# Patient Record
Sex: FEMALE | Race: WHITE | URBAN - METROPOLITAN AREA
[De-identification: names, ages, dates, MRNs, and addresses within clinical notes are randomized per-mention and may not be internally consistent; named-entity substitution may affect disease eponyms.]

---

## 2023-04-18 ENCOUNTER — OFFICE VISIT (OUTPATIENT)
Dept: INTERNAL MEDICINE CLINIC | Facility: CLINIC | Age: 28
End: 2023-04-18

## 2023-04-18 VITALS
HEART RATE: 86 BPM | HEIGHT: 68 IN | BODY MASS INDEX: 23.64 KG/M2 | DIASTOLIC BLOOD PRESSURE: 66 MMHG | OXYGEN SATURATION: 98 % | SYSTOLIC BLOOD PRESSURE: 98 MMHG | WEIGHT: 156 LBS

## 2023-04-18 DIAGNOSIS — Z13.220 LIPID SCREENING: ICD-10-CM

## 2023-04-18 DIAGNOSIS — Z13.0 SCREENING FOR DEFICIENCY ANEMIA: ICD-10-CM

## 2023-04-18 DIAGNOSIS — Z13.21 ENCOUNTER FOR VITAMIN DEFICIENCY SCREENING: ICD-10-CM

## 2023-04-18 DIAGNOSIS — R87.618 PAP SMEAR ABNORMALITY OF CERVIX/HUMAN PAPILLOMAVIRUS (HPV) POSITIVE: ICD-10-CM

## 2023-04-18 DIAGNOSIS — Z13.29 THYROID DISORDER SCREEN: ICD-10-CM

## 2023-04-18 DIAGNOSIS — B37.31 CANDIDA VAGINITIS: ICD-10-CM

## 2023-04-18 DIAGNOSIS — Z00.00 WELLNESS EXAMINATION: Primary | ICD-10-CM

## 2023-04-18 DIAGNOSIS — R73.09 ELEVATED GLUCOSE: ICD-10-CM

## 2023-04-18 PROBLEM — F12.90 CANNABIS USE DISORDER: Status: ACTIVE | Noted: 2023-04-18

## 2023-04-18 PROBLEM — F41.1 GAD (GENERALIZED ANXIETY DISORDER): Status: ACTIVE | Noted: 2023-04-18

## 2023-04-18 PROCEDURE — 3074F SYST BP LT 130 MM HG: CPT | Performed by: INTERNAL MEDICINE

## 2023-04-18 PROCEDURE — 3078F DIAST BP <80 MM HG: CPT | Performed by: INTERNAL MEDICINE

## 2023-04-18 PROCEDURE — 3008F BODY MASS INDEX DOCD: CPT | Performed by: INTERNAL MEDICINE

## 2023-04-18 PROCEDURE — 99395 PREV VISIT EST AGE 18-39: CPT | Performed by: INTERNAL MEDICINE

## 2023-04-18 PROCEDURE — 99214 OFFICE O/P EST MOD 30 MIN: CPT | Performed by: INTERNAL MEDICINE

## 2023-04-18 RX ORDER — FLUCONAZOLE 150 MG/1
TABLET ORAL
Qty: 12 TABLET | Refills: 0 | Status: SHIPPED | OUTPATIENT
Start: 2023-04-18

## 2023-04-18 RX ORDER — CITALOPRAM 40 MG/1
40 TABLET ORAL DAILY
COMMUNITY
Start: 2023-04-04

## 2023-06-08 ENCOUNTER — PATIENT MESSAGE (OUTPATIENT)
Dept: INTERNAL MEDICINE CLINIC | Facility: CLINIC | Age: 28
End: 2023-06-08

## 2023-06-08 DIAGNOSIS — B37.31 CANDIDA VAGINITIS: ICD-10-CM

## 2023-06-08 RX ORDER — FLUCONAZOLE 150 MG/1
TABLET ORAL
Qty: 6 TABLET | Refills: 0 | Status: SHIPPED | OUTPATIENT
Start: 2023-06-08

## 2023-06-22 DIAGNOSIS — B37.31 CANDIDA VAGINITIS: ICD-10-CM

## 2023-06-26 ENCOUNTER — PATIENT MESSAGE (OUTPATIENT)
Dept: INTERNAL MEDICINE CLINIC | Facility: CLINIC | Age: 28
End: 2023-06-26

## 2023-06-27 RX ORDER — FLUCONAZOLE 150 MG/1
TABLET ORAL
Qty: 12 TABLET | Refills: 0 | Status: SHIPPED | OUTPATIENT
Start: 2023-06-27

## 2023-06-28 ENCOUNTER — PATIENT MESSAGE (OUTPATIENT)
Dept: INTERNAL MEDICINE CLINIC | Facility: CLINIC | Age: 28
End: 2023-06-28

## 2023-06-29 RX ORDER — LORAZEPAM 1 MG/1
1 TABLET ORAL EVERY 6 HOURS PRN
Qty: 30 TABLET | Refills: 0 | Status: SHIPPED | OUTPATIENT
Start: 2023-06-29

## 2023-07-05 ENCOUNTER — LAB ENCOUNTER (OUTPATIENT)
Dept: LAB | Facility: HOSPITAL | Age: 28
End: 2023-07-05
Attending: INTERNAL MEDICINE
Payer: COMMERCIAL

## 2023-07-05 ENCOUNTER — OFFICE VISIT (OUTPATIENT)
Dept: OBGYN CLINIC | Facility: CLINIC | Age: 28
End: 2023-07-05

## 2023-07-05 VITALS
HEIGHT: 68 IN | WEIGHT: 155.63 LBS | SYSTOLIC BLOOD PRESSURE: 114 MMHG | HEART RATE: 61 BPM | DIASTOLIC BLOOD PRESSURE: 74 MMHG | BODY MASS INDEX: 23.59 KG/M2

## 2023-07-05 DIAGNOSIS — F41.9 ANXIETY: ICD-10-CM

## 2023-07-05 DIAGNOSIS — F12.90 MARIJUANA USE: ICD-10-CM

## 2023-07-05 DIAGNOSIS — Z71.6 ENCOUNTER FOR SMOKING CESSATION COUNSELING: ICD-10-CM

## 2023-07-05 DIAGNOSIS — Z12.4 SCREENING FOR MALIGNANT NEOPLASM OF CERVIX: ICD-10-CM

## 2023-07-05 DIAGNOSIS — Z01.419 WELL WOMAN EXAM WITH ROUTINE GYNECOLOGICAL EXAM: Primary | ICD-10-CM

## 2023-07-05 DIAGNOSIS — Z13.21 ENCOUNTER FOR VITAMIN DEFICIENCY SCREENING: ICD-10-CM

## 2023-07-05 DIAGNOSIS — Z13.29 THYROID DISORDER SCREEN: ICD-10-CM

## 2023-07-05 DIAGNOSIS — Z13.220 LIPID SCREENING: ICD-10-CM

## 2023-07-05 DIAGNOSIS — Z13.0 SCREENING FOR DEFICIENCY ANEMIA: ICD-10-CM

## 2023-07-05 DIAGNOSIS — R73.09 ELEVATED GLUCOSE: ICD-10-CM

## 2023-07-05 DIAGNOSIS — Z00.00 WELLNESS EXAMINATION: ICD-10-CM

## 2023-07-05 DIAGNOSIS — B37.31 CANDIDA VAGINITIS: ICD-10-CM

## 2023-07-05 LAB
ALBUMIN SERPL-MCNC: 4.1 G/DL (ref 3.4–5)
ALBUMIN/GLOB SERPL: 1.6 {RATIO} (ref 1–2)
ALP LIVER SERPL-CCNC: 30 U/L
ALT SERPL-CCNC: 21 U/L
ANION GAP SERPL CALC-SCNC: 9 MMOL/L (ref 0–18)
AST SERPL-CCNC: 15 U/L (ref 15–37)
BASOPHILS # BLD AUTO: 0.03 X10(3) UL (ref 0–0.2)
BASOPHILS NFR BLD AUTO: 0.4 %
BILIRUB SERPL-MCNC: 0.6 MG/DL (ref 0.1–2)
BUN BLD-MCNC: 12 MG/DL (ref 7–18)
BUN/CREAT SERPL: 21.1 (ref 10–20)
CALCIUM BLD-MCNC: 9.9 MG/DL (ref 8.5–10.1)
CHLORIDE SERPL-SCNC: 109 MMOL/L (ref 98–112)
CHOLEST SERPL-MCNC: 149 MG/DL (ref ?–200)
CO2 SERPL-SCNC: 22 MMOL/L (ref 21–32)
CREAT BLD-MCNC: 0.57 MG/DL
DEPRECATED RDW RBC AUTO: 38.9 FL (ref 35.1–46.3)
EOSINOPHIL # BLD AUTO: 0.08 X10(3) UL (ref 0–0.7)
EOSINOPHIL NFR BLD AUTO: 1 %
ERYTHROCYTE [DISTWIDTH] IN BLOOD BY AUTOMATED COUNT: 11.7 % (ref 11–15)
EST. AVERAGE GLUCOSE BLD GHB EST-MCNC: 103 MG/DL (ref 68–126)
FASTING PATIENT LIPID ANSWER: NO
FASTING STATUS PATIENT QL REPORTED: NO
GFR SERPLBLD BASED ON 1.73 SQ M-ARVRAT: 127 ML/MIN/1.73M2 (ref 60–?)
GLOBULIN PLAS-MCNC: 2.6 G/DL (ref 2.8–4.4)
GLUCOSE BLD-MCNC: 81 MG/DL (ref 70–99)
HBA1C MFR BLD: 5.2 % (ref ?–5.7)
HCT VFR BLD AUTO: 38.5 %
HDLC SERPL-MCNC: 61 MG/DL (ref 40–59)
HGB BLD-MCNC: 13.3 G/DL
IMM GRANULOCYTES # BLD AUTO: 0.02 X10(3) UL (ref 0–1)
IMM GRANULOCYTES NFR BLD: 0.3 %
LDLC SERPL CALC-MCNC: 76 MG/DL (ref ?–100)
LYMPHOCYTES # BLD AUTO: 2.49 X10(3) UL (ref 1–4)
LYMPHOCYTES NFR BLD AUTO: 32.7 %
MCH RBC QN AUTO: 31.5 PG (ref 26–34)
MCHC RBC AUTO-ENTMCNC: 34.5 G/DL (ref 31–37)
MCV RBC AUTO: 91.2 FL
MONOCYTES # BLD AUTO: 0.75 X10(3) UL (ref 0.1–1)
MONOCYTES NFR BLD AUTO: 9.8 %
NEUTROPHILS # BLD AUTO: 4.25 X10 (3) UL (ref 1.5–7.7)
NEUTROPHILS # BLD AUTO: 4.25 X10(3) UL (ref 1.5–7.7)
NEUTROPHILS NFR BLD AUTO: 55.8 %
NONHDLC SERPL-MCNC: 88 MG/DL (ref ?–130)
OSMOLALITY SERPL CALC.SUM OF ELEC: 289 MOSM/KG (ref 275–295)
PLATELET # BLD AUTO: 246 10(3)UL (ref 150–450)
POTASSIUM SERPL-SCNC: 3.6 MMOL/L (ref 3.5–5.1)
PROT SERPL-MCNC: 6.7 G/DL (ref 6.4–8.2)
RBC # BLD AUTO: 4.22 X10(6)UL
SODIUM SERPL-SCNC: 140 MMOL/L (ref 136–145)
TRIGL SERPL-MCNC: 60 MG/DL (ref 30–149)
TSI SER-ACNC: 0.41 MIU/ML (ref 0.36–3.74)
VIT D+METAB SERPL-MCNC: 25.4 NG/ML (ref 30–100)
VLDLC SERPL CALC-MCNC: 9 MG/DL (ref 0–30)
WBC # BLD AUTO: 7.6 X10(3) UL (ref 4–11)

## 2023-07-05 PROCEDURE — 83036 HEMOGLOBIN GLYCOSYLATED A1C: CPT

## 2023-07-05 PROCEDURE — 80053 COMPREHEN METABOLIC PANEL: CPT

## 2023-07-05 PROCEDURE — 3074F SYST BP LT 130 MM HG: CPT | Performed by: OBSTETRICS & GYNECOLOGY

## 2023-07-05 PROCEDURE — 3078F DIAST BP <80 MM HG: CPT | Performed by: OBSTETRICS & GYNECOLOGY

## 2023-07-05 PROCEDURE — 80061 LIPID PANEL: CPT

## 2023-07-05 PROCEDURE — 82306 VITAMIN D 25 HYDROXY: CPT

## 2023-07-05 PROCEDURE — 84443 ASSAY THYROID STIM HORMONE: CPT

## 2023-07-05 PROCEDURE — 99385 PREV VISIT NEW AGE 18-39: CPT | Performed by: OBSTETRICS & GYNECOLOGY

## 2023-07-05 PROCEDURE — 85025 COMPLETE CBC W/AUTO DIFF WBC: CPT

## 2023-07-05 PROCEDURE — 3008F BODY MASS INDEX DOCD: CPT | Performed by: OBSTETRICS & GYNECOLOGY

## 2023-07-05 PROCEDURE — 36415 COLL VENOUS BLD VENIPUNCTURE: CPT

## 2023-07-05 RX ORDER — CITALOPRAM 40 MG/1
40 TABLET ORAL DAILY
Qty: 90 TABLET | Refills: 3 | Status: SHIPPED | OUTPATIENT
Start: 2023-07-05

## 2023-07-05 NOTE — PROGRESS NOTES
Jose Damon is a 29year old female [place] [id number] Patient's last menstrual period was 2023. Patient presents with:  Post-Op  Gyn Exam: NEW PATIENT, ANNUAL EXAM  Presenting for well woman exam. Last pap smear was ASCUS HPV positive 2022 with normal colposcopy. Has noticed worsening hemorrhoids, discussed OTC cream use. Reports recurrent yeast infections. Is taking weekly Diflucan. Grandmother with breast cancer that was negative BRCA. Monthly menses with normal flow. Not currently sexually active; declines STD testing. OBSTETRICS HISTORY:  OB History     T0    L0    SAB1  IAB0  Ectopic0  Multiple0  Live Births0     GYNE HISTORY:  Patient's last menstrual period was 2023. Sexual activity:   Not Currently        Hx Prior Abnormal Pap: Yes  Pap Result Notes:  ABNORMAL PAP PER PT      MEDICAL HISTORY:  History reviewed. No pertinent past medical history. SURGICAL HISTORY:  Past Surgical History:   Procedure Laterality Date    SURGERY - EXTERNAL      tonsillectomy and adnoids    SURGERY - EXTERNAL      rhinoplasty       SOCIAL HISTORY:  Social History    Socioeconomic History      Marital status: Unknown      Spouse name: Not on file      Number of children: Not on file      Years of education: Not on file      Highest education level: Not on file    Occupational History      Not on file    Tobacco Use      Smoking status: Every Day      Smokeless tobacco: Not on file    Substance and Sexual Activity      Alcohol use:  Yes        Alcohol/week: 9.0 standard drinks of alcohol        Types: 9 Standard drinks or equivalent per week        Comment: SOCIALY      Drug use: Yes        Types: Cannabis      Sexual activity: Not Currently    Other Topics      Concerns:        Not on file    Social History Narrative      Not on file    Social Determinants of Health  Financial Resource Strain: Not on file  Food Insecurity: Not on file  Transportation Needs: Not on file  Physical Activity: Not on file  Stress: Not on file  Social Connections: Not on file  Housing Stability: Not on file      Depression Screening (PHQ-2/PHQ-9): Over the LAST 2 WEEKS   Little interest or pleasure in doing things (over the last two weeks)?: Not at all    Feeling down, depressed, or hopeless (over the last two weeks)?: Not at all    PHQ-2 SCORE: 0           MEDICATIONS:    Current Outpatient Medications:     LORazepam 1 MG Oral Tab, Take 1 tablet (1 mg total) by mouth every 6 (six) hours as needed for Anxiety. , Disp: 30 tablet, Rfl: 0    fluconazole 150 MG Oral Tab, TAKE 1 TABLET BY MOUTH ONCE EVERY 3 DAYS FOLLOWED BY 1 TABLET EVERY WEEK, Disp: 12 tablet, Rfl: 0    citalopram 40 MG Oral Tab, Take 1 tablet (40 mg total) by mouth daily. , Disp: 90 tablet, Rfl: 3    ALLERGIES:    Hydrocodone-Acetami*    UNKNOWN      Review of Systems:  Review of Systems   All other systems reviewed and are negative. 07/05/23  1328   BP: 114/74   Pulse: 61       PHYSICAL EXAM:   Physical Exam  Vitals reviewed. Constitutional:       Appearance: Normal appearance. HENT:      Head: Atraumatic. Eyes:      Pupils: Pupils are equal, round, and reactive to light. Pulmonary:      Effort: Pulmonary effort is normal.   Chest:   Breasts:     Right: Normal. No bleeding, inverted nipple, mass, nipple discharge, skin change or tenderness. Left: Normal. No bleeding, inverted nipple, mass, nipple discharge, skin change or tenderness. Abdominal:      General: Abdomen is flat. Palpations: Abdomen is soft. Tenderness: There is no abdominal tenderness. Genitourinary:     General: Normal vulva. Exam position: Lithotomy position. Labia:         Right: No rash, tenderness, lesion or injury. Left: No rash, tenderness, lesion or injury. Vagina: Normal.      Cervix: Normal.      Uterus: Normal. Not tender. Adnexa: Right adnexa normal and left adnexa normal.        Right: No tenderness or fullness.           Left: No tenderness or fullness. Lymphadenopathy:      Upper Body:      Right upper body: No supraclavicular, axillary or pectoral adenopathy. Left upper body: No supraclavicular, axillary or pectoral adenopathy. Skin:     General: Skin is warm and dry. Neurological:      General: No focal deficit present. Mental Status: She is alert and oriented to person, place, and time. Psychiatric:         Mood and Affect: Mood normal.         Behavior: Behavior normal.         Thought Content: Thought content normal.         Judgment: Judgment normal.           Assessment & Plan:  Tawana Perez was seen today for post-op and gyn exam.    Diagnoses and all orders for this visit:    Well woman exam with routine gynecological exam    Candida vaginitis  -     GENITAL VAGINOSIS SCREEN; Future  -     GENITAL VAGINOSIS SCREEN    Anxiety    Encounter for smoking cessation counseling    Marijuana use    Screening for malignant neoplasm of cervix  -     THINPREP PAP WITH HPV REFLEX REQUEST B; Future  -     THINPREP PAP WITH HPV REFLEX REQUEST B        Requested Prescriptions      No prescriptions requested or ordered in this encounter       Pap smear collected. Vaginal culture collected. Counseled regarding tobacco and marijuana cessation. Encourage SBE. Recommend exams yearly.

## 2023-07-05 NOTE — TELEPHONE ENCOUNTER
Refill passed per [place] [facility] protocol. Requested Prescriptions   Pending Prescriptions Disp Refills    citalopram 40 MG Oral Tab 30 tablet 0     Sig: Take 1 tablet (40 mg total) by mouth daily.        Psychiatric Non-Scheduled (Anti-Anxiety) Passed - 7/5/2023  1:26 PM        Passed - In person appointment or virtual visit in the past 6 mos or appointment in next 3 mos     Recent Outpatient Visits              Today Well woman exam with routine gynecological exam    [address], [address], [place] - OB/GYN Laura Lizarraga MD    Office Visit    2 months ago Wellness examination    Joyce Pennington MD    Office Visit    5 years ago Chondromalacia of left patella    Flavio Ramos MD    Office Visit                           Recent Outpatient Visits              Today Well woman exam with routine gynecological exam    [address], [address], [facility] Laura Lizarraga MD    Office Visit    2 months ago Wellness examination    Joyce Pennington MD    Office Visit    5 years ago Chondromalacia of left patella    Orthopaedics - Mark Daniel MD    Office Visit

## 2023-07-06 DIAGNOSIS — E55.9 VITAMIN D DEFICIENCY: Primary | ICD-10-CM

## 2023-07-06 RX ORDER — ERGOCALCIFEROL 1.25 MG/1
50000 CAPSULE ORAL WEEKLY
Qty: 4 CAPSULE | Refills: 0 | Status: SHIPPED | OUTPATIENT
Start: 2023-07-06 | End: 2023-08-05

## 2023-07-07 LAB
GENITAL VAGINOSIS SCREEN: NEGATIVE
TRICHOMONAS SCREEN: NEGATIVE

## 2023-08-22 ENCOUNTER — OFFICE VISIT (OUTPATIENT)
Dept: OBGYN CLINIC | Facility: CLINIC | Age: 28
End: 2023-08-22

## 2023-08-22 VITALS
SYSTOLIC BLOOD PRESSURE: 101 MMHG | DIASTOLIC BLOOD PRESSURE: 66 MMHG | HEART RATE: 80 BPM | BODY MASS INDEX: 25 KG/M2 | WEIGHT: 166 LBS

## 2023-08-22 DIAGNOSIS — N89.8 VAGINAL DISCHARGE: Primary | ICD-10-CM

## 2023-08-22 DIAGNOSIS — Z01.419 WELL WOMAN EXAM WITH ROUTINE GYNECOLOGICAL EXAM: ICD-10-CM

## 2023-08-22 DIAGNOSIS — B37.31 CANDIDA VAGINITIS: ICD-10-CM

## 2023-08-22 PROCEDURE — 3078F DIAST BP <80 MM HG: CPT | Performed by: OBSTETRICS & GYNECOLOGY

## 2023-08-22 PROCEDURE — 87070 CULTURE OTHR SPECIMN AEROBIC: CPT | Performed by: OBSTETRICS & GYNECOLOGY

## 2023-08-22 PROCEDURE — 87077 CULTURE AEROBIC IDENTIFY: CPT | Performed by: OBSTETRICS & GYNECOLOGY

## 2023-08-22 PROCEDURE — 3074F SYST BP LT 130 MM HG: CPT | Performed by: OBSTETRICS & GYNECOLOGY

## 2023-08-22 PROCEDURE — 87205 SMEAR GRAM STAIN: CPT | Performed by: OBSTETRICS & GYNECOLOGY

## 2023-08-22 PROCEDURE — 99213 OFFICE O/P EST LOW 20 MIN: CPT | Performed by: OBSTETRICS & GYNECOLOGY

## 2023-08-22 NOTE — PROGRESS NOTES
Tsering Arenas is a 29year old female  Patient's last menstrual period was 2023 (exact date). Patient presents with:  Gyn Exam: Follow up on recurrent yeast infection after taking medication   Presenting for evaluation of recurrent yeast infections. She used boric acid suppositories  which helped for a brief amount of time, but then had return of white yeast with an itch. Last use of diflucan was 3 weeks ago. OBSTETRICS HISTORY:  OB History     T0    L0    SAB1  IAB0  Ectopic0  Multiple0  Live Births0     GYNE HISTORY:  Patient's last menstrual period was 2023 (exact date). Sexual activity:   Not Currently               MEDICAL HISTORY:  History reviewed. No pertinent past medical history. SURGICAL HISTORY:  Past Surgical History:   Procedure Laterality Date    SURGERY - EXTERNAL      tonsillectomy and adnoids    SURGERY - EXTERNAL      rhinoplasty       SOCIAL HISTORY:  Social History    Socioeconomic History      Marital status: Unknown      Spouse name: Not on file      Number of children: Not on file      Years of education: Not on file      Highest education level: Not on file    Occupational History      Not on file    Tobacco Use      Smoking status: Every Day      Smokeless tobacco: Not on file    Substance and Sexual Activity      Alcohol use:  Yes        Alcohol/week: 9.0 standard drinks of alcohol        Types: 9 Standard drinks or equivalent per week        Comment: SOCIALY      Drug use: Yes        Types: Cannabis      Sexual activity: Not Currently    Other Topics      Concerns:        Not on file    Social History Narrative      Not on file    Social Determinants of Health  Financial Resource Strain: Not on file  Food Insecurity: Not on file  Transportation Needs: Not on file  Physical Activity: Not on file  Stress: Not on file  Social Connections: Not on file  Housing Stability: Not on file      MEDICATIONS:    Current Outpatient Medications: citalopram 40 MG Oral Tab, Take 1 tablet (40 mg total) by mouth daily. , Disp: 90 tablet, Rfl: 3    LORazepam 1 MG Oral Tab, Take 1 tablet (1 mg total) by mouth every 6 (six) hours as needed for Anxiety. , Disp: 30 tablet, Rfl: 0    fluconazole 150 MG Oral Tab, TAKE 1 TABLET BY MOUTH ONCE EVERY 3 DAYS FOLLOWED BY 1 TABLET EVERY WEEK (Patient not taking: Reported on 8/22/2023), Disp: 12 tablet, Rfl: 0    ALLERGIES:    Hydrocodone-Acetami*    UNKNOWN      Review of Systems:  Review of Systems   All other systems reviewed and are negative. 08/22/23  1403   BP: 101/66   Pulse: 80       PHYSICAL EXAM:   Physical Exam  Vitals reviewed. Constitutional:       Appearance: Normal appearance. HENT:      Head: Atraumatic. Eyes:      Pupils: Pupils are equal, round, and reactive to light. Pulmonary:      Effort: Pulmonary effort is normal.   Abdominal:      General: Abdomen is flat. Palpations: Abdomen is soft. Tenderness: There is no abdominal tenderness. Genitourinary:     General: Normal vulva. Exam position: Lithotomy position. Labia:         Right: No rash, tenderness, lesion or injury. Left: No rash, tenderness, lesion or injury. Vagina: Normal.      Cervix: Normal.      Uterus: Normal. Not tender. Adnexa: Right adnexa normal and left adnexa normal.        Right: No tenderness or fullness. Left: No tenderness or fullness. Skin:     General: Skin is warm and dry. Neurological:      General: No focal deficit present. Mental Status: She is alert and oriented to person, place, and time. Psychiatric:         Mood and Affect: Mood normal.         Behavior: Behavior normal.         Thought Content: Thought content normal.         Judgment: Judgment normal.           Assessment & Plan:  Rustam Duarte was seen today for gyn exam.    Diagnoses and all orders for this visit:    Vaginal discharge  -     CHLAMYDIA/GONOCOCCUS, ROBERT;  Future  -     GENITAL VAGINOSIS SCREEN; Future  -     GENITAL VAGINOSIS SCREEN  -     CHLAMYDIA/GONOCOCCUS, ROBERT  -     ANAEROBIC CULTURE; Future  -     ANAEROBIC CULTURE        Requested Prescriptions      No prescriptions requested or ordered in this encounter       Vaginal cultures collected. Will await results prior to treatment. Paula Rangel

## 2023-08-23 ENCOUNTER — TELEPHONE (OUTPATIENT)
Dept: OBGYN CLINIC | Facility: CLINIC | Age: 28
End: 2023-08-23

## 2023-08-23 DIAGNOSIS — N89.8 VAGINAL DISCHARGE: ICD-10-CM

## 2023-08-23 DIAGNOSIS — Z01.419 WELL WOMAN EXAM WITH ROUTINE GYNECOLOGICAL EXAM: ICD-10-CM

## 2023-08-23 DIAGNOSIS — B37.31 CANDIDA VAGINITIS: Primary | ICD-10-CM

## 2023-08-23 LAB
C TRACH DNA SPEC QL NAA+PROBE: NEGATIVE
N GONORRHOEA DNA SPEC QL NAA+PROBE: NEGATIVE

## 2023-08-23 NOTE — ADDENDUM NOTE
Addended by: Madison Bowling on: 8/23/2023 01:12 PM     Modules accepted: Orders Complex Repair And Graft Additional Text (Will Appearing After The Standard Complex Repair Text): The complex repair was not sufficient to completely close the primary defect. The remaining additional defect was repaired with the graft mentioned below.

## 2023-08-23 NOTE — TELEPHONE ENCOUNTER
Received call from [facility] in the lab indicating to complete an Anaerobic culture they also need an Aerobic culture also. Order placed. To JIMENEZ for review and sign-off, Thank you.

## 2023-08-24 ENCOUNTER — E-VISIT (OUTPATIENT)
Dept: TELEHEALTH | Age: 28
End: 2023-08-24
Payer: COMMERCIAL

## 2023-08-24 DIAGNOSIS — Z02.9 ADMINISTRATIVE ENCOUNTER: Primary | ICD-10-CM

## 2023-08-24 LAB
GENITAL VAGINOSIS SCREEN: NEGATIVE
TRICHOMONAS SCREEN: NEGATIVE

## 2023-08-25 ENCOUNTER — HOSPITAL ENCOUNTER (OUTPATIENT)
Age: 28
Discharge: HOME | End: 2023-08-25
Payer: COMMERCIAL

## 2023-08-25 VITALS
DIASTOLIC BLOOD PRESSURE: 77 MMHG | RESPIRATION RATE: 20 BRPM | OXYGEN SATURATION: 100 % | SYSTOLIC BLOOD PRESSURE: 127 MMHG | TEMPERATURE: 98 F | HEART RATE: 81 BPM

## 2023-08-25 DIAGNOSIS — H10.213 CHEMICAL CONJUNCTIVITIS OF BOTH EYES: Primary | ICD-10-CM

## 2023-08-25 PROCEDURE — 99213 OFFICE O/P EST LOW 20 MIN: CPT | Performed by: NURSE PRACTITIONER

## 2023-08-25 RX ORDER — ERYTHROMYCIN 5 MG/G
1 OINTMENT OPHTHALMIC EVERY 6 HOURS
Qty: 1 G | Refills: 0 | Status: SHIPPED | OUTPATIENT
Start: 2023-08-25 | End: 2023-09-01

## 2023-08-25 NOTE — PROGRESS NOTES
Refer to patient Questionnaire and responses. See MyChart messages. Patient referred to a higher level of care. The [facility] OP IC is closed due to a power outage problem. No [facility] facility within a reasonable proximity to patient. [place] another [address] [facility] nearby. E-visit cancelled with no charge.

## 2023-08-25 NOTE — ED INITIAL ASSESSMENT (HPI)
Pt states Wednesday had her lashes done and shortly after began having irritation in right eye with redness. Pt states woke up with some crusting in eyes. Pt states left eye feels slightly irritated. Pt denies vision changes.

## 2023-09-13 RX ORDER — CITALOPRAM 40 MG/1
40 TABLET ORAL DAILY
Qty: 90 TABLET | Refills: 3 | OUTPATIENT
Start: 2023-09-13

## 2023-09-13 RX ORDER — LORAZEPAM 1 MG/1
1 TABLET ORAL EVERY 6 HOURS PRN
Qty: 30 TABLET | Refills: 0 | OUTPATIENT
Start: 2023-09-13

## 2023-09-14 RX ORDER — LORAZEPAM 1 MG/1
1 TABLET ORAL EVERY 6 HOURS PRN
Qty: 30 TABLET | Refills: 0 | Status: SHIPPED | OUTPATIENT
Start: 2023-09-14

## 2023-10-04 ENCOUNTER — PATIENT MESSAGE (OUTPATIENT)
Dept: OBGYN CLINIC | Facility: CLINIC | Age: 28
End: 2023-10-04

## 2023-10-05 NOTE — TELEPHONE ENCOUNTER
To JIMENEZ to please review and advise. Pt has hx of recurrent yeast infections per office notes on 8/22/23. Pt did have culture done on 8/22 that noted bacteria called Actinomyces and treated with Amoxicillin.

## 2023-10-14 ENCOUNTER — HOSPITAL ENCOUNTER (OUTPATIENT)
Age: 28
Discharge: HOME | End: 2023-10-14
Payer: COMMERCIAL

## 2023-10-14 VITALS
SYSTOLIC BLOOD PRESSURE: 129 MMHG | TEMPERATURE: 98 F | HEART RATE: 62 BPM | OXYGEN SATURATION: 99 % | DIASTOLIC BLOOD PRESSURE: 71 MMHG | RESPIRATION RATE: 20 BRPM

## 2023-10-14 DIAGNOSIS — N89.8 VAGINAL DISCHARGE: Primary | ICD-10-CM

## 2023-10-14 LAB — B-HCG UR QL: NEGATIVE

## 2023-10-14 PROCEDURE — 87106 FUNGI IDENTIFICATION YEAST: CPT | Performed by: NURSE PRACTITIONER

## 2023-10-14 PROCEDURE — 87808 TRICHOMONAS ASSAY W/OPTIC: CPT | Performed by: NURSE PRACTITIONER

## 2023-10-14 PROCEDURE — 99213 OFFICE O/P EST LOW 20 MIN: CPT | Performed by: NURSE PRACTITIONER

## 2023-10-14 PROCEDURE — 81025 URINE PREGNANCY TEST: CPT | Performed by: NURSE PRACTITIONER

## 2023-10-14 PROCEDURE — 87205 SMEAR GRAM STAIN: CPT | Performed by: NURSE PRACTITIONER

## 2023-10-14 NOTE — ED INITIAL ASSESSMENT (HPI)
Patient c/o vaginal irritation and discharge that started 2 weeks ago. Patient has been treated for bv multiple times this year last time was 8/22/23.

## 2023-10-16 LAB
GENITAL VAGINOSIS SCREEN: NEGATIVE
TRICHOMONAS SCREEN: NEGATIVE

## 2023-10-27 ENCOUNTER — PATIENT MESSAGE (OUTPATIENT)
Dept: OBGYN CLINIC | Facility: CLINIC | Age: 28
End: 2023-10-27

## 2023-11-03 ENCOUNTER — OFFICE VISIT (OUTPATIENT)
Dept: OBGYN CLINIC | Facility: CLINIC | Age: 28
End: 2023-11-03
Payer: COMMERCIAL

## 2023-11-03 VITALS
HEART RATE: 69 BPM | DIASTOLIC BLOOD PRESSURE: 69 MMHG | BODY MASS INDEX: 25 KG/M2 | WEIGHT: 162.19 LBS | SYSTOLIC BLOOD PRESSURE: 104 MMHG

## 2023-11-03 DIAGNOSIS — N89.8 VAGINAL IRRITATION: Primary | ICD-10-CM

## 2023-11-03 PROCEDURE — 99212 OFFICE O/P EST SF 10 MIN: CPT | Performed by: NURSE PRACTITIONER

## 2023-11-03 PROCEDURE — 87205 SMEAR GRAM STAIN: CPT | Performed by: NURSE PRACTITIONER

## 2023-11-03 PROCEDURE — 3074F SYST BP LT 130 MM HG: CPT | Performed by: NURSE PRACTITIONER

## 2023-11-03 PROCEDURE — 87186 SC STD MICRODIL/AGAR DIL: CPT | Performed by: NURSE PRACTITIONER

## 2023-11-03 PROCEDURE — 87077 CULTURE AEROBIC IDENTIFY: CPT | Performed by: NURSE PRACTITIONER

## 2023-11-03 PROCEDURE — 3078F DIAST BP <80 MM HG: CPT | Performed by: NURSE PRACTITIONER

## 2023-11-03 PROCEDURE — 87070 CULTURE OTHR SPECIMN AEROBIC: CPT | Performed by: NURSE PRACTITIONER

## 2023-11-04 ENCOUNTER — TELEPHONE (OUTPATIENT)
Dept: OBGYN CLINIC | Facility: CLINIC | Age: 28
End: 2023-11-04

## 2023-11-04 DIAGNOSIS — N89.8 VAGINAL IRRITATION: Primary | ICD-10-CM

## 2023-11-04 NOTE — TELEPHONE ENCOUNTER
Per Chastity Horn in the reference lab, swab used at visit can only check aerobic cultures, not anaerobic. Pt had both aerobic and anaerobic cultures run on 8/22/2023, but only the anaerobic culture was abnormal.  Per [facility], she wanted to run the anaerobic culture to see if the bacteria was still present. Per [facility], have lab run aerobic and find out which swab should be used for anaerobic. Then check with pt to see if she wants to come back for swab. Spoke with Katerine Lema in micro. She states the e-swab needs to be used for anaerobic cultures. Spoke with pt and explained situation. Pt very understanding. Appt booked on Monday. Swab to use for anaerobic culture is the white top e-swab.

## 2023-11-06 ENCOUNTER — TELEPHONE (OUTPATIENT)
Dept: OBGYN CLINIC | Facility: CLINIC | Age: 28
End: 2023-11-06

## 2023-11-06 LAB
C TRACH DNA SPEC QL NAA+PROBE: POSITIVE
GENITAL VAGINOSIS SCREEN: NEGATIVE
N GONORRHOEA DNA SPEC QL NAA+PROBE: NEGATIVE
TRICHOMONAS SCREEN: NEGATIVE

## 2023-11-06 NOTE — TELEPHONE ENCOUNTER
+chlamydia. Please rx doxycycline 100 mg PO BID x 7 days. Partner needs to get treatment with PCP or free [facility] clinic. No intercourse til BOTH partners are 1 week post finishing treatment. Please fill out [facility] form. Negative BV, yeast, negative trich and gonorrhea. She was going to return for an anerorobic culture (eswab), I would offer for her to wait to see if symptoms return after treatment.

## 2023-11-08 ENCOUNTER — TELEPHONE (OUTPATIENT)
Dept: OBGYN CLINIC | Facility: CLINIC | Age: 28
End: 2023-11-08

## 2023-11-08 RX ORDER — FLUCONAZOLE 150 MG/1
150 TABLET ORAL ONCE
Qty: 1 TABLET | Refills: 0 | Status: SHIPPED | OUTPATIENT
Start: 2023-11-08 | End: 2023-11-08

## 2023-11-08 RX ORDER — SULFAMETHOXAZOLE AND TRIMETHOPRIM 800; 160 MG/1; MG/1
1 TABLET ORAL 2 TIMES DAILY
Qty: 14 TABLET | Refills: 0 | Status: SHIPPED | OUTPATIENT
Start: 2023-11-08

## 2023-11-08 NOTE — TELEPHONE ENCOUNTER
----- Message from Darrick Kayser, APRN sent at 11/8/2023  2:25 PM CST -----  +vaginal culture - need treatment with bactrim DS one tablet PO BID x 7 days. Take after finishing doxycycline for chlamydia.   Start probiotic daily for next month  Okay to give diflucan 150 mg PO x 1 to take if develops yeast infection    Darrick Kayser, APRN

## 2023-11-08 NOTE — TELEPHONE ENCOUNTER
Pt informed of results and recs for bactrim. Pt advised to finish doxy first, then start the bactrim. Pt advised to start a probiotic and that diflucan x1 will also be sent in in case a yeast infection develops. Pt states it the past diflucan will only work of she takes 3 doses. Pt advised to call if she develops yeast symptoms and we can check if more diflucan can be sent. Pt asked if we know why she developed this bacteria. Pt informed we do not but we always recommend wiping front to back. Pt will call if symptoms do not resolve to book another appt for more cultures.

## 2024-01-04 ENCOUNTER — OFFICE VISIT (OUTPATIENT)
Dept: OBGYN CLINIC | Facility: CLINIC | Age: 29
End: 2024-01-04
Payer: COMMERCIAL

## 2024-01-04 VITALS
WEIGHT: 163 LBS | SYSTOLIC BLOOD PRESSURE: 105 MMHG | HEART RATE: 61 BPM | DIASTOLIC BLOOD PRESSURE: 69 MMHG | BODY MASS INDEX: 25 KG/M2

## 2024-01-04 DIAGNOSIS — N89.8 VAGINAL ODOR: ICD-10-CM

## 2024-01-04 DIAGNOSIS — Z30.09 ENCOUNTER FOR COUNSELING REGARDING CONTRACEPTION: Primary | ICD-10-CM

## 2024-01-04 PROCEDURE — 3074F SYST BP LT 130 MM HG: CPT | Performed by: OBSTETRICS & GYNECOLOGY

## 2024-01-04 PROCEDURE — 99213 OFFICE O/P EST LOW 20 MIN: CPT | Performed by: OBSTETRICS & GYNECOLOGY

## 2024-01-04 PROCEDURE — 3078F DIAST BP <80 MM HG: CPT | Performed by: OBSTETRICS & GYNECOLOGY

## 2024-01-04 NOTE — PROGRESS NOTES
Tawana Jeff is a 28 year old female  Patient's last menstrual period was 12/10/2023 (exact date).   Chief Complaint   Patient presents with    Consult     NEXPLANON CONSULT   Presenting to discuss Nexplanon. She has had the Paragard IUD in the past and is not interested in another IUD. She does not want OCP, Nuvaring, or Depo. Reviewed nexplanon and will plan to proceed with insertion.   ANISA chlamydia testing today. She reports a vaginal odor and would like testing for BV.    OBSTETRICS HISTORY:  OB History    Para Term  AB Living   1 0 0 0 1 0   SAB IAB Ectopic Multiple Live Births   1 0 0 0 0       GYNE HISTORY:  Patient's last menstrual period was 12/10/2023 (exact date).    History   Sexual Activity    Sexual activity: Not Currently        Hx Prior Abnormal Pap: Yes  Pap Date: 23  Pap Result Notes: NEG PAP      MEDICAL HISTORY:  No past medical history on file.      SURGICAL HISTORY:  Past Surgical History:   Procedure Laterality Date    SURGERY - EXTERNAL      tonsillectomy and adnoids    SURGERY - EXTERNAL      rhinoplasty       SOCIAL HISTORY:  Social History     Socioeconomic History    Marital status: Unknown     Spouse name: Not on file    Number of children: Not on file    Years of education: Not on file    Highest education level: Not on file   Occupational History    Not on file   Tobacco Use    Smoking status: Every Day    Smokeless tobacco: Not on file   Substance and Sexual Activity    Alcohol use: Yes     Alcohol/week: 9.0 standard drinks of alcohol     Types: 9 Standard drinks or equivalent per week     Comment: SOCIALY    Drug use: Yes     Types: Cannabis    Sexual activity: Not Currently   Other Topics Concern    Not on file   Social History Narrative    Not on file     Social Determinants of Health     Financial Resource Strain: Not on file   Food Insecurity: Not on file   Transportation Needs: Not on file   Physical Activity: Not on file   Stress: Not on file   Social  Connections: Not on file   Housing Stability: Not on file         Depression Screening (PHQ-2/PHQ-9): Over the LAST 2 WEEKS   Little interest or pleasure in doing things: Not at all    Feeling down, depressed, or hopeless: Not at all    PHQ-2 SCORE: 0           MEDICATIONS:    Current Outpatient Medications:     sulfamethoxazole-trimethoprim DS (BACTRIM DS) 800-160 MG Oral Tab per tablet, Take 1 tablet by mouth 2 (two) times daily., Disp: 14 tablet, Rfl: 0    LORazepam 1 MG Oral Tab, Take 1 tablet (1 mg total) by mouth every 6 (six) hours as needed for Anxiety., Disp: 10 tablet, Rfl: 0    citalopram 40 MG Oral Tab, Take 1 tablet (40 mg total) by mouth daily. (Patient not taking: Reported on 11/3/2023), Disp: 90 tablet, Rfl: 3    fluconazole 150 MG Oral Tab, TAKE 1 TABLET BY MOUTH ONCE EVERY 3 DAYS FOLLOWED BY 1 TABLET EVERY WEEK (Patient not taking: Reported on 8/22/2023), Disp: 12 tablet, Rfl: 0    ALLERGIES:  No Known Allergies      Review of Systems:  Review of Systems   All other systems reviewed and are negative.       Vitals:    01/04/24 1648   BP: 105/69   Pulse: 61       PHYSICAL EXAM:   Physical Exam  Vitals reviewed.   Constitutional:       Appearance: Normal appearance.   HENT:      Head: Atraumatic.   Eyes:      Pupils: Pupils are equal, round, and reactive to light.   Pulmonary:      Effort: Pulmonary effort is normal.   Abdominal:      General: Abdomen is flat.      Palpations: Abdomen is soft.      Tenderness: There is no abdominal tenderness.   Genitourinary:     General: Normal vulva.      Exam position: Lithotomy position.      Labia:         Right: No rash, tenderness, lesion or injury.         Left: No rash, tenderness, lesion or injury.       Vagina: Normal.      Cervix: Normal.      Uterus: Normal. Not tender.       Adnexa: Right adnexa normal and left adnexa normal.        Right: No tenderness or fullness.          Left: No tenderness or fullness.     Skin:     General: Skin is warm and dry.    Neurological:      General: No focal deficit present.      Mental Status: She is alert and oriented to person, place, and time.   Psychiatric:         Mood and Affect: Mood normal.         Behavior: Behavior normal.         Thought Content: Thought content normal.         Judgment: Judgment normal.           Assessment & Plan:  Tawana was seen today for consult.    Diagnoses and all orders for this visit:    Encounter for counseling regarding contraception    Vaginal odor  -     Chlamydia/Gc Amplification; Future  -     Vaginitis Vaginosis PCR Panel; Future  -     Chlamydia/Gc Amplification  -     Vaginitis Vaginosis PCR Panel        Requested Prescriptions      No prescriptions requested or ordered in this encounter       Vaginal cultures collected. RTC for placement of Nexplanon.

## 2024-01-05 LAB
BV BACTERIA DNA VAG QL NAA+PROBE: NEGATIVE
C GLABRATA DNA VAG QL NAA+PROBE: NEGATIVE
C KRUSEI DNA VAG QL NAA+PROBE: NEGATIVE
C TRACH DNA SPEC QL NAA+PROBE: NEGATIVE
CANDIDA DNA VAG QL NAA+PROBE: POSITIVE
N GONORRHOEA DNA SPEC QL NAA+PROBE: NEGATIVE
T VAGINALIS DNA VAG QL NAA+PROBE: NEGATIVE

## 2024-01-08 ENCOUNTER — TELEPHONE (OUTPATIENT)
Dept: OBGYN CLINIC | Facility: CLINIC | Age: 29
End: 2024-01-08

## 2024-01-08 DIAGNOSIS — Z32.00 ENCOUNTER FOR PREGNANCY TEST, RESULT UNKNOWN: Primary | ICD-10-CM

## 2024-01-08 NOTE — TELEPHONE ENCOUNTER
Pt states her period started with a light flow on 1/5/2024 and full flow in 1/6/024.  Per protocol book, JIMENEZ placed nexplanon on days 1-5.  No openings available.  Message to JIMENEZ to see if pt can be added.

## 2024-01-09 NOTE — TELEPHONE ENCOUNTER
Pt called and accepts appt on 1/16 at 2 pm with [facility] for Nexplanon. Pt also aware she needs to compete HCG the day before. Order placed.

## 2024-01-15 ENCOUNTER — LAB ENCOUNTER (OUTPATIENT)
Dept: LAB | Facility: HOSPITAL | Age: 29
End: 2024-01-15
Attending: OBSTETRICS & GYNECOLOGY
Payer: COMMERCIAL

## 2024-01-15 DIAGNOSIS — Z32.00 ENCOUNTER FOR PREGNANCY TEST, RESULT UNKNOWN: ICD-10-CM

## 2024-01-15 LAB — HCG SERPL QL: NEGATIVE

## 2024-01-15 PROCEDURE — 36415 COLL VENOUS BLD VENIPUNCTURE: CPT

## 2024-01-15 PROCEDURE — 84703 CHORIONIC GONADOTROPIN ASSAY: CPT

## 2024-01-16 ENCOUNTER — OFFICE VISIT (OUTPATIENT)
Dept: OBGYN CLINIC | Facility: CLINIC | Age: 29
End: 2024-01-16
Payer: COMMERCIAL

## 2024-01-16 VITALS
WEIGHT: 166 LBS | HEART RATE: 68 BPM | SYSTOLIC BLOOD PRESSURE: 111 MMHG | BODY MASS INDEX: 25 KG/M2 | DIASTOLIC BLOOD PRESSURE: 77 MMHG

## 2024-01-16 DIAGNOSIS — Z30.017 NEXPLANON INSERTION: Primary | ICD-10-CM

## 2024-01-16 PROCEDURE — 3078F DIAST BP <80 MM HG: CPT | Performed by: OBSTETRICS & GYNECOLOGY

## 2024-01-16 PROCEDURE — 3074F SYST BP LT 130 MM HG: CPT | Performed by: OBSTETRICS & GYNECOLOGY

## 2024-01-16 PROCEDURE — 11981 INSERTION DRUG DLVR IMPLANT: CPT | Performed by: OBSTETRICS & GYNECOLOGY

## 2024-01-16 NOTE — PROCEDURES
Nexplanon Insertion    Pregnancy Results: negative from blood test   Birth control method(s) used:  ; date last used:    Consent was obtained from the patient.      Insertion:  The patient was positioned with her left arm flexed.    Measurement was taken from her epicondyle approximately 8 cm and marked 3 cm apart from the orginal charlette.  1% lidocaine with epinephrine  was used to inject the planned insertion site.  Device opened, solomon confirmed within device.  Lateral traction of skin performed while Nexplanon inserted.  The Nexplanon was placed 8 cm from the epicondyle without difficulty.    The ridged portion of the applicator was seen once the device was withdrawn.      Visit Plan:  Steri-Strips were applied to the skin incision.  An Ace bandage was wrapped around the injected arm.  Both Physician and pt confirmed device by tactile feel.  Patient was instructed to remove Ace bandage in 24 hours.  Patient was instructed to remove Steri-Strips in 7 days.  All of the patient's questions were addressed.

## 2024-05-09 ENCOUNTER — PATIENT MESSAGE (OUTPATIENT)
Dept: OBGYN CLINIC | Facility: CLINIC | Age: 29
End: 2024-05-09

## 2024-05-09 NOTE — TELEPHONE ENCOUNTER
From: Tawana Jeff  To: Marjan Conley  Sent: 5/9/2024 2:45 PM CDT  Subject: Something off    Hello!  I have been having an abnormal smell and some discharge the last couple days. Thought it was my period but something is definitely off. Is there any chance I can come in tomorrow? I leave for [place] on Saturday.  Tawana

## 2024-05-10 ENCOUNTER — OFFICE VISIT (OUTPATIENT)
Dept: OBGYN CLINIC | Facility: CLINIC | Age: 29
End: 2024-05-10

## 2024-05-10 VITALS
WEIGHT: 175 LBS | BODY MASS INDEX: 26.22 KG/M2 | HEART RATE: 76 BPM | SYSTOLIC BLOOD PRESSURE: 107 MMHG | HEIGHT: 68.5 IN | DIASTOLIC BLOOD PRESSURE: 72 MMHG

## 2024-05-10 DIAGNOSIS — N89.8 VAGINAL ODOR: Primary | ICD-10-CM

## 2024-05-10 PROCEDURE — 99212 OFFICE O/P EST SF 10 MIN: CPT | Performed by: NURSE PRACTITIONER

## 2024-05-10 PROCEDURE — 3078F DIAST BP <80 MM HG: CPT | Performed by: NURSE PRACTITIONER

## 2024-05-10 PROCEDURE — 3008F BODY MASS INDEX DOCD: CPT | Performed by: NURSE PRACTITIONER

## 2024-05-10 PROCEDURE — 3074F SYST BP LT 130 MM HG: CPT | Performed by: NURSE PRACTITIONER

## 2024-05-10 RX ORDER — METRONIDAZOLE 7.5 MG/G
1 GEL VAGINAL NIGHTLY
Qty: 1 EACH | Refills: 0 | Status: SHIPPED | OUTPATIENT
Start: 2024-05-10 | End: 2024-05-15

## 2024-05-10 NOTE — PROGRESS NOTES
[facility]    Obstetrics and Gynecology    Chief Complaint   Patient presents with    Gyn Exam     Vaginal odor       Tawana Jeff is a 29 year old female  Patient's last menstrual period was 2024 (exact date). presenting for gynecology exam.  Noticed an odor with last period and odor has continued last few days. Slight itching and irritation also.    No new partners, same partner. No concerns with intercourse. No pelvic pain no urinary symptoms.    No changes in soaps or detergents.    Nexplanon in place    Pap:2023 NILM   Contraception:nexplanon  Mammo: n/a    OBSTETRICS HISTORY:  OB History    Para Term  AB Living   1 0 0 0 1 0   SAB IAB Ectopic Multiple Live Births   1 0 0 0 0       GYNE HISTORY:  Menarche: 12-13 (5/10/2024  9:46 AM)  Period Cycle (Days): MONTHLY (5/10/2024  9:46 AM)  Period Duration (Days): 7 (5/10/2024  9:46 AM)  Period Flow: NORMAL (5/10/2024  9:46 AM)  Use of Birth Control (if yes, specify type): None (5/10/2024  9:46 AM)  Hx Prior Abnormal Pap: Yes (5/10/2024  9:46 AM)  Pap Date: 23 (5/10/2024  9:46 AM)  Pap Result Notes: NEG PAP (5/10/2024  9:46 AM)      History   Sexual Activity    Sexual activity: Not Currently           Latest Ref Rng & Units 2023     4:14 PM   RECENT PAP RESULTS   Thinprep Pap Negative for intraepithelial lesion or malignancy Negative for intraepithelial lesion or malignancy          MEDICAL HISTORY:  History reviewed. No pertinent past medical history.  Past Surgical History:   Procedure Laterality Date    Surgery - external      tonsillectomy and adnoids    Surgery - external      rhinoplasty       SOCIAL HISTORY:  Social History     Socioeconomic History    Marital status: Unknown     Spouse name: Not on file    Number of children: Not on file    Years of education: Not on file    Highest education level: Not on file   Occupational History    Not on file   Tobacco Use    Smoking status: Every Day    Smokeless tobacco:  Not on file   Substance and Sexual Activity    Alcohol use: Yes     Alcohol/week: 9.0 standard drinks of alcohol     Types: 9 Standard drinks or equivalent per week     Comment: SOCIALY    Drug use: Yes     Types: Cannabis    Sexual activity: Not Currently   Other Topics Concern    Not on file   Social History Narrative    Not on file     Social Determinants of Health     Financial Resource Strain: Not on file   Food Insecurity: Not on file   Transportation Needs: Not on file   Physical Activity: Not on file   Stress: Not on file   Social Connections: Not on file   Housing Stability: Low Risk  (6/30/2023)    [facility], [facility]    Housing Stability     Mortgage Payment Concerns?: Not on file     Number of Places Lived in the Last Year: Not on file     Unstable Housing?: Not on file         Depression Screening (PHQ-2/PHQ-9): Over the LAST 2 WEEKS   Little interest or pleasure in doing things (over the last two weeks)?: Not at all    Feeling down, depressed, or hopeless (over the last two weeks)?: Not at all    PHQ-2 SCORE: 0           FAMILY HISTORY:  Family History   Problem Relation Age of Onset    Hyperlipidemia Father     Breast Cancer Maternal Grandmother 60       MEDICATIONS:    Current Outpatient Medications:     metroNIDAZOLE 0.75 % Vaginal Gel, Place 1 Applicatorful vaginally nightly for 5 days., Disp: 1 each, Rfl: 0    fluconazole (DIFLUCAN) 150 MG Oral Tab, Take 1 tablet (150 mg total) by mouth As Directed. Take one tablet by mouth today, repeat one tablet by mouth in 3 days, Disp: 2 tablet, Rfl: 0    sulfamethoxazole-trimethoprim DS (BACTRIM DS) 800-160 MG Oral Tab per tablet, Take 1 tablet by mouth 2 (two) times daily., Disp: 14 tablet, Rfl: 0    LORazepam 1 MG Oral Tab, Take 1 tablet (1 mg total) by mouth every 6 (six) hours as needed for Anxiety., Disp: 10 tablet, Rfl: 0    citalopram 40 MG Oral Tab, Take 1 tablet (40 mg total) by mouth daily.  (Patient not taking: Reported on 11/3/2023), Disp: 90 tablet, Rfl: 3    fluconazole 150 MG Oral Tab, TAKE 1 TABLET BY MOUTH ONCE EVERY 3 DAYS FOLLOWED BY 1 TABLET EVERY WEEK (Patient not taking: Reported on 8/22/2023), Disp: 12 tablet, Rfl: 0    ALLERGIES:  No Known Allergies      Review of Systems:  Constitutional:  Denies fatigue, night sweats, hot flashes  Eyes:  denies blurred or double vision  Cardiovascular:  denies chest pain or palpitations  Respiratory:  denies shortness of breath  Gastrointestinal:  denies heartburn, abdominal pain, diarrhea or constipation  Genitourinary:  denies dysuria, incontinence, abnormal vaginal discharge, vaginal itching, +vaginal odor  Musculoskeletal:  denies back pain   Skin/Breast:  Denies any breast pain, lumps, or discharge.   Neurological:  denies headaches, extremity weakness or numbness.  Psychiatric: denies depression or anxiety.  Endocrine:   denies excessive thirst or urination.  Heme/Lymph:  denies history of anemia, easy bruising or bleeding.      PHYSICAL EXAM:     Vitals:    05/10/24 0946   BP: 107/72   Pulse: 76   Weight: 175 lb (79.4 kg)   Height: 5' 8.5\" (1.74 m)       Body mass index is 26.22 kg/m².     Patient offered chaperone, patient declined    Constitutional: well developed, well nourished  Psychiatric:  Oriented to time, place, person and situation. Appropriate mood and affect    Pelvic Exam:  External Genitalia: normal appearance, hair distribution, and no lesions  Urethral Meatus:  normal in size, location, without lesions and prolapse  Bladder:  No fullness, masses or tenderness  Vagina:  Normal appearance without lesions, +thin white abnormal discharge with odor  Cervix:  Normal without tenderness on motion  Uterus: normal in size, contour, position, mobility, without tenderness  Adnexa: normal without masses or tenderness  Perineum: normal  Anus: no hemorroids     Assessment & Plan:    ICD-10-CM    1. Vaginal odor  N89.8 Vaginitis Vaginosis PCR  Panel         Will treat for BV on exam with metrogel vaginal x 5 nights    RAKESH Anne    This note was prepared using Dragon Medical voice recognition dictation software. As a result errors may occur. When identified these errors have been corrected. While every attempt is made to correct errors during dictation discrepancies may still exist.

## 2024-05-11 LAB
BV BACTERIA DNA VAG QL NAA+PROBE: POSITIVE
C GLABRATA DNA VAG QL NAA+PROBE: NEGATIVE
C KRUSEI DNA VAG QL NAA+PROBE: NEGATIVE
CANDIDA DNA VAG QL NAA+PROBE: NEGATIVE
T VAGINALIS DNA VAG QL NAA+PROBE: NEGATIVE

## 2024-06-21 ENCOUNTER — PATIENT MESSAGE (OUTPATIENT)
Dept: OBGYN CLINIC | Facility: CLINIC | Age: 29
End: 2024-06-21

## 2024-06-21 NOTE — TELEPHONE ENCOUNTER
From: Tawana Jeff  To: Marjan Conley  Sent: 6/21/2024 11:31 AM CDT  Subject: GP recommendation    Hi Dr. Conley,  I am in search of a new GP and would love a recommendation from you. Someone with your riley bedside manner and vast knowledge of the general field would be amazing. A woman would be preferred but am open to others as well.  Thank you!  Tawana

## 2024-06-23 NOTE — TELEPHONE ENCOUNTER
Please thank her for the kind message! I recommend either Dr. Kinza Wiseman or Dr. Joanna Engle. They are both in the [facility] office.

## 2024-06-28 ENCOUNTER — PATIENT MESSAGE (OUTPATIENT)
Dept: OBGYN CLINIC | Facility: CLINIC | Age: 29
End: 2024-06-28

## 2024-06-29 NOTE — TELEPHONE ENCOUNTER
From: Tawana Jeff  To: Marjan Conley  Sent: 6/28/2024 5:09 PM CDT  Subject: BV    Hello Dr. Conley,  I had a yeast infection while on vacation and took OTC medicine. As a result I believe I have BV again. Same symptoms as when I came in last time and the smell is the same. Are you able to prescribe for me without coming in?   Tawana

## 2024-07-01 ENCOUNTER — PATIENT MESSAGE (OUTPATIENT)
Dept: OBGYN CLINIC | Facility: CLINIC | Age: 29
End: 2024-07-01

## 2024-07-01 NOTE — TELEPHONE ENCOUNTER
From: Tawana Jeff  To: Marjan Conley  Sent: 7/1/2024 12:21 PM CDT  Subject: Testing before annual exam    Good afternoon,   I am coming in for my annual exam in August and would like to be tested for PCOS and have my fertility checked. Want to make sure that my blood is tested before I have my appt so I can speak to Dr. Conley regarding the results. Is it possible to have the testing done beforehand?  Thank you!  Tawana

## 2024-07-02 ENCOUNTER — OFFICE VISIT (OUTPATIENT)
Dept: OBGYN CLINIC | Facility: CLINIC | Age: 29
End: 2024-07-02
Payer: COMMERCIAL

## 2024-07-02 VITALS
DIASTOLIC BLOOD PRESSURE: 77 MMHG | BODY MASS INDEX: 26 KG/M2 | SYSTOLIC BLOOD PRESSURE: 122 MMHG | HEART RATE: 83 BPM | WEIGHT: 171.81 LBS

## 2024-07-02 DIAGNOSIS — N89.8 VAGINAL ODOR: Primary | ICD-10-CM

## 2024-07-02 DIAGNOSIS — N64.4 MASTALGIA: ICD-10-CM

## 2024-07-02 PROCEDURE — 3074F SYST BP LT 130 MM HG: CPT | Performed by: OBSTETRICS & GYNECOLOGY

## 2024-07-02 PROCEDURE — 99213 OFFICE O/P EST LOW 20 MIN: CPT | Performed by: OBSTETRICS & GYNECOLOGY

## 2024-07-02 PROCEDURE — 3078F DIAST BP <80 MM HG: CPT | Performed by: OBSTETRICS & GYNECOLOGY

## 2024-07-03 ENCOUNTER — PATIENT MESSAGE (OUTPATIENT)
Dept: OBGYN CLINIC | Facility: CLINIC | Age: 29
End: 2024-07-03

## 2024-07-04 NOTE — PROGRESS NOTES
Tawana Jeff is a 29 year old female  Patient's last menstrual period was 06/15/2024 (exact date).   Chief Complaint   Patient presents with    Gyn Problem     C/o vaginal discharge an odor, left breast tenderness      JMN pt.   Got a yeast infection when in [place] 2 weeks ago and used yeast preparation.  In last 1-2 weeks, having discolored vaginal discharge and vaginal odor.    Also having intermittent left breast pain in last 2 weeks.   Under increased stress.  Has Nexplanon.  LMP 6/15/24.    Has annual with [facility] next month.      OBSTETRICS HISTORY:  OB History    Para Term  AB Living   1 0 0 0 1 0   SAB IAB Ectopic Multiple Live Births   1 0 0 0 0       GYNE HISTORY   Hx Prior Abnormal Pap: Yes  Pap Date: 23  Pap Result Notes: Pap neg, HPV Neg ////    MEDICAL HISTORY:  No past medical history on file.  Past Surgical History:   Procedure Laterality Date    Surgery - external      tonsillectomy and adnoids    Surgery - external      rhinoplasty       SOCIAL HISTORY:  Social History     Socioeconomic History    Marital status: Single   Tobacco Use    Smoking status: Every Day    Tobacco comments:     Vapes, but is quitting   Substance and Sexual Activity    Alcohol use: Yes     Alcohol/week: 9.0 standard drinks of alcohol     Types: 9 Standard drinks or equivalent per week     Comment: SOCIALY    Drug use: Not Currently     Types: Cannabis    Sexual activity: Yes     Birth control/protection: Implant     Social Determinants of Health      Received from [facility], [facility]    Housing Stability       MEDICATIONS:  Current Outpatient Medications   Medication Sig Dispense Refill    COLLAGEN OR Take by mouth.      LORazepam 1 MG Oral Tab Take 1 tablet (1 mg total) by mouth every 6 (six) hours as needed for Anxiety. 10 tablet 0       ALLERGIES:  No Known Allergies      PHYSICAL EXAM:   /77   Pulse 83   Wt 171 lb 12.8 oz (77.9 kg)   LMP  06/15/2024 (Exact Date)   BMI 25.74 kg/m²   Body mass index is 25.74 kg/m².    Constitutional: well developed, well nourished  Breast: normal without palpable masses, tenderness, asymmetry, nipple discharge, nipple retraction or skin changes       Pelvic Exam:  External Genitalia: normal appearance, hair distribution, and no lesions  Urethral Meatus:  normal in size, location, without lesions and prolapse  Bladder:  No fullness, masses or tenderness  Vagina:  Normal appearance without lesions, no abnormal discharge  Cervix:  Normal      Assessment & Plan:  1. Vaginal odor  Vaginal culture.      2.  Mastalgia  Reassurance.        Requested Prescriptions      No prescriptions requested or ordered in this encounter

## 2024-07-05 RX ORDER — METRONIDAZOLE 500 MG/1
500 TABLET ORAL 2 TIMES DAILY
Qty: 14 TABLET | Refills: 0 | Status: SHIPPED | OUTPATIENT
Start: 2024-07-05 | End: 2024-07-05

## 2024-07-05 RX ORDER — METRONIDAZOLE 7.5 MG/G
1 GEL VAGINAL NIGHTLY
Qty: 70 G | Refills: 0 | Status: SHIPPED | OUTPATIENT
Start: 2024-07-05

## 2024-07-05 NOTE — TELEPHONE ENCOUNTER
Message to on call provider.  Dr. Holt patient.  Patient positive for bacterial vaginosis.  Dr. Holt prescribed flagyl but patient is requesting metrogel.  Ok to switch prescription?

## 2024-07-05 NOTE — TELEPHONE ENCOUNTER
Melody Holt MD  7/3/2024 10:15 PM CDT Back to Top      Vaginal culture--+bacterial vaginosis.    Flagyl 500 mg bid x  7 days

## 2024-07-05 NOTE — TELEPHONE ENCOUNTER
From: Tawana Jeff  To: Melody Holt  Sent: 7/3/2024 6:20 PM CDT  Subject: BV positive     Good afternoon,  I received the results of my vaginal swab and I am positive for BV. Dr. Holt told me to reach out in case I can be prescribed while she is out of office.   Thank you!!  Tawana

## 2024-07-29 ENCOUNTER — HOSPITAL ENCOUNTER (OUTPATIENT)
Age: 29
Discharge: HOME | End: 2024-07-29
Payer: COMMERCIAL

## 2024-07-29 VITALS
TEMPERATURE: 98 F | OXYGEN SATURATION: 100 % | SYSTOLIC BLOOD PRESSURE: 106 MMHG | RESPIRATION RATE: 20 BRPM | DIASTOLIC BLOOD PRESSURE: 73 MMHG | HEART RATE: 65 BPM

## 2024-07-29 DIAGNOSIS — H00.012 HORDEOLUM EXTERNUM OF RIGHT LOWER EYELID: Primary | ICD-10-CM

## 2024-07-29 PROCEDURE — 99213 OFFICE O/P EST LOW 20 MIN: CPT | Performed by: EMERGENCY MEDICINE

## 2024-07-29 RX ORDER — ERYTHROMYCIN 5 MG/G
OINTMENT OPHTHALMIC
Qty: 1 G | Refills: 0 | Status: SHIPPED | OUTPATIENT
Start: 2024-07-29

## 2024-07-29 NOTE — ED INITIAL ASSESSMENT (HPI)
Pt came in due to right lower lid swelling for the past 4 days. Pt denies any injury or trauma. Pt concerned for stye. Pt c/o  redness, swelling, and pain. Pt has easy non labored respirations.

## 2024-07-29 NOTE — ED PROVIDER NOTES
Patient Seen in: [facility]      History     Chief Complaint   Patient presents with    Eye Problem     Stated Complaint: R Eye Pain    Subjective:   HPI    Tawana Jeff is a 29 year old female Here for right lower stye that started a few days ago.  Usually resolved by now with home treatment.  Multiple conservative pressures with minimal relief.  No  discharge, vision changes, unilateral weakness, worst headache of her life, or other complaints at this time.  Immunizations up-to-date.    Objective:   History reviewed. No pertinent past medical history.           Past Surgical History:   Procedure Laterality Date    Surgery - external      tonsillectomy and adnoids    Surgery - external      rhinoplasty                No pertinent social history.            Review of Systems    Positive for stated Chief Complaint: Eye Problem    Other systems are as noted in HPI.  Constitutional and vital signs reviewed.      All other systems reviewed and negative except as noted above.    Physical Exam     ED Triage Vitals [07/29/24 1410]   /73   Pulse 65   Resp 20   Temp 98.2 °F (36.8 °C)   Temp src Temporal   SpO2 100 %   O2 Device None (Room air)       Current Vitals:   Vital Signs  BP: 106/73  Pulse: 65  Resp: 20  Temp: 98.2 °F (36.8 °C)  Temp src: Temporal    Oxygen Therapy  SpO2: 100 %  O2 Device: None (Room air)            Physical Exam  Vitals and nursing note reviewed.   Constitutional:       Appearance: Normal appearance. She is not ill-appearing.   HENT:      Head: Normocephalic.   Eyes:      Extraocular Movements: Extraocular movements intact.      Conjunctiva/sclera: Conjunctivae normal.      Right eye: Right conjunctiva is not injected. No chemosis or hemorrhage.     Pupils: Pupils are equal, round, and reactive to light.     Pulmonary:      Effort: Pulmonary effort is normal. No respiratory distress.   Musculoskeletal:      Cervical back: Normal range of motion.   Skin:     Findings: Erythema  (mild limited to stye area 0.5cm) present. No rash.   Neurological:      Mental Status: She is alert and oriented to person, place, and time.   Psychiatric:         Mood and Affect: Mood normal.         Behavior: Behavior normal.         Thought Content: Thought content normal.         Judgment: Judgment normal.             ED Course   Labs Reviewed - No data to display                   MDM                                        Medical Decision Making  Ddx: herpes zoster ophthalmicus, contact dermatitis, atopic dermatitis, malignancies (basal or squamous cell carcinoma), stye.    We will treat for stye.  Continue warm compresses, and erythromycin sent to the pharmacy to use as directed.  Neuro intact without focal deficit.    Defer Immediate imaging of the orbits; patient not presenting acutely with vision changes, decreased extraocular movements, or penetrating trauma.  No signs of periorbital cellulitis.  Oral antibiotics not indicated at this time.    Ophthalmology follow-up as needed.  Reinforced ER precautions, and primary care follow-up.  All questions answered, no acute distress, cleared for discharge home       Amount and/or Complexity of Data Reviewed  External Data Reviewed: notes.    Risk  OTC drugs.  Prescription drug management.        Disposition and Plan     Clinical Impression:  1. Hordeolum externum of right lower eyelid         Disposition:  Discharge  7/29/2024  2:33 pm    Follow-up:  Ervin Beyer MD  [address] [address]  [phone]          [facility]  [address] [facility] [address] [phone]  Schedule an appointment as soon as possible for a visit             Medications Prescribed:  Discharge Medication List as of 7/29/2024  2:34 PM        START taking these medications    Details   erythromycin 5 MG/GM Ophthalmic Ointment Wash your hands.  Apply 1 cm ribbon to right lower lid. Wash your hands again. Repeat this 3 times a day  for the next 7 days., Normal, Disp-1 g, [id number].  Collaborating physician Natty Rutledge

## 2024-08-22 ENCOUNTER — HOSPITAL ENCOUNTER (OUTPATIENT)
Age: 29
Discharge: HOME | End: 2024-08-22
Payer: COMMERCIAL

## 2024-08-22 VITALS
OXYGEN SATURATION: 100 % | DIASTOLIC BLOOD PRESSURE: 86 MMHG | RESPIRATION RATE: 20 BRPM | HEART RATE: 84 BPM | TEMPERATURE: 98 F | SYSTOLIC BLOOD PRESSURE: 112 MMHG

## 2024-08-22 DIAGNOSIS — N76.0 ACUTE VAGINITIS: Primary | ICD-10-CM

## 2024-08-22 LAB — B-HCG UR QL: NEGATIVE

## 2024-08-22 PROCEDURE — 81025 URINE PREGNANCY TEST: CPT | Performed by: NURSE PRACTITIONER

## 2024-08-22 PROCEDURE — 99214 OFFICE O/P EST MOD 30 MIN: CPT | Performed by: NURSE PRACTITIONER

## 2024-08-22 PROCEDURE — 87591 N.GONORRHOEAE DNA AMP PROB: CPT | Performed by: NURSE PRACTITIONER

## 2024-08-22 PROCEDURE — 87491 CHLMYD TRACH DNA AMP PROBE: CPT | Performed by: NURSE PRACTITIONER

## 2024-08-22 PROCEDURE — 81514 NFCT DS BV&VAGINITIS DNA ALG: CPT | Performed by: NURSE PRACTITIONER

## 2024-08-22 RX ORDER — FLUCONAZOLE 150 MG/1
150 TABLET ORAL ONCE
Qty: 3 TABLET | Refills: 0 | Status: SHIPPED | OUTPATIENT
Start: 2024-08-22 | End: 2024-08-22

## 2024-08-22 NOTE — ED PROVIDER NOTES
Patient Seen in: [facility]      History     Chief Complaint   Patient presents with    Gynecologic Exam     Need to be tested for yeast and BV infection - Entered by patient     Stated Complaint: Gynecologic Exam - Need to be tested for yeast and BV infection    Subjective:   30 y/o female with unremarkable medical history presents with c/o white vaginal discharge and vaginal irritation onset Sunday.  States has a history of recurrent yeast and BV and would like to be tested for both.  Patient states in the past when she has had use use he gets 3 courses of Diflucan.  Over-the-counter meds do not work for her.  Patient states sometimes gets discharged before her period. No vaginal bleeding, abd pain, pelvic pain, urinary symptoms            Objective:   History reviewed. No pertinent past medical history.           Past Surgical History:   Procedure Laterality Date    Surgery - external      tonsillectomy and adnoids    Surgery - external      rhinoplasty                Social History     Socioeconomic History    Marital status: Single   Tobacco Use    Smoking status: Every Day    Tobacco comments:     Vapes, but is quitting   Substance and Sexual Activity    Alcohol use: Yes     Alcohol/week: 9.0 standard drinks of alcohol     Types: 9 Standard drinks or equivalent per week     Comment: SOCIALY    Drug use: Not Currently     Types: Cannabis    Sexual activity: Yes     Birth control/protection: Implant     Social Determinants of Health      [facility]    Housing Stability              Review of Systems   Constitutional:  Negative for chills and fever.   Gastrointestinal:  Negative for abdominal pain, nausea and vomiting.   Genitourinary:  Positive for vaginal discharge. Negative for dysuria, flank pain, frequency, hematuria, urgency and vaginal bleeding.   All other systems reviewed and are negative.      Positive for stated Chief  Complaint: Gynecologic Exam (Need to be tested for yeast and BV infection - Entered by patient)    Other systems are as noted in HPI.  Constitutional and vital signs reviewed.      All other systems reviewed and negative except as noted above.    Physical Exam     ED Triage Vitals [08/22/24 1611]   /86   Pulse 84   Resp 20   Temp 97.8 °F (36.6 °C)   Temp src Temporal   SpO2 100 %   O2 Device None (Room air)       Current Vitals:   Vital Signs  BP: 112/86  Pulse: 84  Resp: 20  Temp: 97.8 °F (36.6 °C)  Temp src: Temporal    Oxygen Therapy  SpO2: 100 %  O2 Device: None (Room air)            Physical Exam  Vitals and nursing note reviewed. Chaperone present: offered and declined.   Constitutional:       Appearance: Normal appearance.   HENT:      Head: Normocephalic.   Cardiovascular:      Rate and Rhythm: Normal rate and regular rhythm.   Pulmonary:      Effort: Pulmonary effort is normal.      Breath sounds: Normal breath sounds.   Genitourinary:     Exam position: Lithotomy position.      Vagina: Vaginal discharge present.      Comments: BUS normal - cervical os closed with moderate amount thick, white discharge.  no adnexal tenderness, no CMT, no genital herpes lesions noted.      Skin:     General: Skin is warm and dry.      Capillary Refill: Capillary refill takes less than 2 seconds.   Neurological:      Mental Status: She is alert and oriented to person, place, and time.               ED Course     Labs Reviewed   POCT PREGNANCY URINE - Normal   CHLAMYDIA/GONOCOCCUS, ROBERT   VAGINITIS VAGINOSIS PCR PANEL                      MDM                                         Medical Decision Making  Patient is well-appearing.  I discussed differentials with patient including but not limited to yeast vaginitis, bacterial vaginosis, STI  Vaginal cultures pending  Will treat for yeast vaginitis  Rx Diflucan  Keep your upcoming appointment with [facility]. Return/ ED precautions discussed      Problems Addressed:  Acute  vaginitis: acute illness or injury    Amount and/or Complexity of Data Reviewed  Labs: ordered. Decision-making details documented in ED Course.        Disposition and Plan     Clinical Impression:  1. Acute vaginitis         Disposition:  Discharge  8/22/2024  4:37 pm    Follow-up:  No follow-up provider specified.        Medications Prescribed:  Discharge Medication List as of 8/22/2024  4:54 PM        START taking these medications    Details   fluconazole (DIFLUCAN) 150 MG Oral Tab Take 1 tablet (150 mg total) by mouth once for 1 dose. Take 1 pill every 72 hours, Normal, Disp-3 tablet, R-0

## 2024-08-23 ENCOUNTER — PATIENT MESSAGE (OUTPATIENT)
Dept: OBGYN CLINIC | Facility: CLINIC | Age: 29
End: 2024-08-23

## 2024-08-23 LAB
BV BACTERIA DNA VAG QL NAA+PROBE: POSITIVE
C GLABRATA DNA VAG QL NAA+PROBE: NEGATIVE
C KRUSEI DNA VAG QL NAA+PROBE: NEGATIVE
C TRACH DNA SPEC QL NAA+PROBE: NEGATIVE
CANDIDA DNA VAG QL NAA+PROBE: NEGATIVE
N GONORRHOEA DNA SPEC QL NAA+PROBE: NEGATIVE
T VAGINALIS DNA VAG QL NAA+PROBE: NEGATIVE

## 2024-08-23 RX ORDER — METRONIDAZOLE 500 MG/1
500 TABLET ORAL 2 TIMES DAILY
Qty: 14 TABLET | Refills: 0 | Status: SHIPPED | OUTPATIENT
Start: 2024-08-23 | End: 2024-08-24

## 2024-08-24 RX ORDER — METRONIDAZOLE 7.5 MG/G
1 GEL VAGINAL NIGHTLY
Qty: 70 G | Refills: 0 | Status: SHIPPED | OUTPATIENT
Start: 2024-08-24 | End: 2024-08-24

## 2024-08-24 RX ORDER — METRONIDAZOLE 7.5 MG/G
1 GEL VAGINAL NIGHTLY
Qty: 5 EACH | Refills: 0 | Status: SHIPPED | OUTPATIENT
Start: 2024-08-24 | End: 2024-08-29

## 2024-08-24 NOTE — TELEPHONE ENCOUNTER
Dr. Conley patient.  Patient was seen in urgent care on 8/22/2024.  Cultures showed bacterial vaginosis.  APRN in urgent care sent in prescription for flagyl.  Patient is requesting prescription for metrogel instead.  Patient states flagyl causes yeast infections when she takes it.  Message to on call provider.  Will you switch medication or should it be sent to Dr. Conley for recommendations?

## 2024-08-24 NOTE — TELEPHONE ENCOUNTER
From: Tawana Jeff  To: Marjan Conley  Sent: 8/23/2024 4:51 PM CDT  Subject: Recent Test Result    Hi Dr. Conley,  I was recently at urgent care and I was tested for BV & a yeast infection. I tested positive for BV & am hoping you or one of your nurses on call could prescribe anti biotics to my pharmacy. My test results can be reviewed in my chart.  Thank you!  Tawana

## 2024-09-11 ENCOUNTER — OFFICE VISIT (OUTPATIENT)
Dept: OBGYN CLINIC | Facility: CLINIC | Age: 29
End: 2024-09-11
Payer: COMMERCIAL

## 2024-09-11 VITALS
BODY MASS INDEX: 26 KG/M2 | SYSTOLIC BLOOD PRESSURE: 107 MMHG | DIASTOLIC BLOOD PRESSURE: 74 MMHG | HEART RATE: 71 BPM | WEIGHT: 176.38 LBS

## 2024-09-11 DIAGNOSIS — N89.8 VAGINAL IRRITATION: ICD-10-CM

## 2024-09-11 DIAGNOSIS — Z11.3 SCREEN FOR STD (SEXUALLY TRANSMITTED DISEASE): Primary | ICD-10-CM

## 2024-09-11 PROCEDURE — 3074F SYST BP LT 130 MM HG: CPT | Performed by: OBSTETRICS & GYNECOLOGY

## 2024-09-11 PROCEDURE — 3078F DIAST BP <80 MM HG: CPT | Performed by: OBSTETRICS & GYNECOLOGY

## 2024-09-11 PROCEDURE — 99395 PREV VISIT EST AGE 18-39: CPT | Performed by: OBSTETRICS & GYNECOLOGY

## 2024-09-11 RX ORDER — SPIRONOLACTONE 100 MG/1
100 TABLET, FILM COATED ORAL DAILY
COMMUNITY

## 2024-09-11 NOTE — PROGRESS NOTES
Tawana Jeff is a 29 year old female  Patient's last menstrual period was 2024 (exact date).   Chief Complaint   Patient presents with    Physical     annual   Presenting for well woman exam.  Last pap smear was normal 2023. Has Nexplanon that was placed 2024. Requesting STD cultures. Recurrent bacterial vaginosis.     OBSTETRICS HISTORY:  OB History    Para Term  AB Living   1 0 0 0 1 0   SAB IAB Ectopic Multiple Live Births   1 0 0 0 0       GYNE HISTORY:  Patient's last menstrual period was 2024 (exact date).    History   Sexual Activity    Sexual activity: Yes    Birth control/ protection: Implant        Hx Prior Abnormal Pap: Yes  Pap Date: 23  Pap Result Notes: Pap neg, HPV Neg ////  Follow Up Recommendation: annual 2023      MEDICAL HISTORY:  History reviewed. No pertinent past medical history.      SURGICAL HISTORY:  Past Surgical History:   Procedure Laterality Date    Surgery - external      tonsillectomy and adnoids    Surgery - external      rhinoplasty       SOCIAL HISTORY:  Social History     Socioeconomic History    Marital status: Single     Spouse name: Not on file    Number of children: Not on file    Years of education: Not on file    Highest education level: Not on file   Occupational History    Not on file   Tobacco Use    Smoking status: Every Day    Smokeless tobacco: Not on file    Tobacco comments:     Vapes, but is quitting   Substance and Sexual Activity    Alcohol use: Yes     Alcohol/week: 9.0 standard drinks of alcohol     Types: 9 Standard drinks or equivalent per week     Comment: SOCIALY    Drug use: Not Currently     Types: Cannabis    Sexual activity: Yes     Birth control/protection: Implant   Other Topics Concern    Not on file   Social History Narrative    Not on file     Social Determinants of Health     Financial Resource Strain: Not on file   Food Insecurity: Not on file   Transportation Needs: Not on file   Physical Activity:  Not on file   Stress: Not on file   Social Connections: Not on file   Housing Stability: Low Risk  (6/30/2023)    [facility], [facility]    Housing Stability     Mortgage Payment Concerns?: Not on file     Number of Places Lived in the Last Year: Not on file     Unstable Housing?: Not on file         Depression Screening (PHQ-2/PHQ-9): Over the LAST 2 WEEKS   Little interest or pleasure in doing things (over the last two weeks)?: Not at all  Little interest or pleasure in doing things: Not at all    Feeling down, depressed, or hopeless (over the last two weeks)?: Not at all  Feeling down, depressed, or hopeless: Not at all    PHQ-2 SCORE: 0  PHQ-2 SCORE: 0           MEDICATIONS:    Current Outpatient Medications:     spironolactone 100 MG Oral Tab, Take 1 tablet (100 mg total) by mouth daily., Disp: , Rfl:     COLLAGEN OR, Take by mouth., Disp: , Rfl:     LORazepam 1 MG Oral Tab, Take 1 tablet (1 mg total) by mouth every 6 (six) hours as needed for Anxiety., Disp: 10 tablet, Rfl: 0    erythromycin 5 MG/GM Ophthalmic Ointment, Wash your hands.  Apply 1 cm ribbon to right lower lid. Wash your hands again. Repeat this 3 times a day for the next 7 days., Disp: 1 g, Rfl: 0    ALLERGIES:  No Known Allergies      Review of Systems:  Review of Systems   All other systems reviewed and are negative.       Vitals:    09/11/24 1556   BP: 107/74   Pulse: 71       PHYSICAL EXAM:   Physical Exam  Vitals reviewed.   Constitutional:       Appearance: Normal appearance.   HENT:      Head: Atraumatic.   Eyes:      Pupils: Pupils are equal, round, and reactive to light.   Pulmonary:      Effort: Pulmonary effort is normal.   Chest:   Breasts:     Right: Normal. No bleeding, inverted nipple, mass, nipple discharge, skin change or tenderness.      Left: Normal. No bleeding, inverted nipple, mass, nipple discharge, skin change or tenderness.   Abdominal:      General: Abdomen is flat.       Palpations: Abdomen is soft.      Tenderness: There is no abdominal tenderness.   Genitourinary:     General: Normal vulva.      Exam position: Lithotomy position.      Labia:         Right: No rash, tenderness, lesion or injury.         Left: No rash, tenderness, lesion or injury.       Vagina: Normal.      Cervix: Normal.      Uterus: Normal. Not tender.       Adnexa: Right adnexa normal and left adnexa normal.        Right: No tenderness or fullness.          Left: No tenderness or fullness.     Lymphadenopathy:      Upper Body:      Right upper body: No supraclavicular, axillary or pectoral adenopathy.      Left upper body: No supraclavicular, axillary or pectoral adenopathy.   Skin:     General: Skin is warm and dry.   Neurological:      General: No focal deficit present.      Mental Status: She is alert and oriented to person, place, and time.   Psychiatric:         Mood and Affect: Mood normal.         Behavior: Behavior normal.         Thought Content: Thought content normal.         Judgment: Judgment normal.           Assessment & Plan:  Tawana was seen today for physical.    Diagnoses and all orders for this visit:    Screen for STD (sexually transmitted disease)  -     Chlamydia/Gc Amplification; Future  -     Chlamydia/Gc Amplification    Vaginal irritation  -     Vaginitis Vaginosis PCR Panel; Future  -     Vaginitis Vaginosis PCR Panel        Requested Prescriptions      No prescriptions requested or ordered in this encounter       Next pap smear due 2026. Vaginal cultures collected. Encourage SBE. Recommend exams yearly.   Possible resistant bacterial vaginosis, consider Clinda gel if positive culture.

## 2024-09-12 LAB
BV BACTERIA DNA VAG QL NAA+PROBE: NEGATIVE
C GLABRATA DNA VAG QL NAA+PROBE: NEGATIVE
C KRUSEI DNA VAG QL NAA+PROBE: NEGATIVE
C TRACH DNA SPEC QL NAA+PROBE: NEGATIVE
CANDIDA DNA VAG QL NAA+PROBE: NEGATIVE
N GONORRHOEA DNA SPEC QL NAA+PROBE: NEGATIVE
T VAGINALIS DNA VAG QL NAA+PROBE: NEGATIVE

## 2024-09-19 ENCOUNTER — OFFICE VISIT (OUTPATIENT)
Dept: INTERNAL MEDICINE CLINIC | Facility: CLINIC | Age: 29
End: 2024-09-19

## 2024-09-19 VITALS
HEART RATE: 72 BPM | SYSTOLIC BLOOD PRESSURE: 108 MMHG | HEIGHT: 68.3 IN | WEIGHT: 178.81 LBS | DIASTOLIC BLOOD PRESSURE: 73 MMHG | BODY MASS INDEX: 27.1 KG/M2

## 2024-09-19 DIAGNOSIS — Z72.89 CURRENT VAPING ON SOME DAYS: ICD-10-CM

## 2024-09-19 DIAGNOSIS — Z00.00 PHYSICAL EXAM, ANNUAL: Primary | ICD-10-CM

## 2024-09-19 DIAGNOSIS — E55.9 VITAMIN D DEFICIENCY: ICD-10-CM

## 2024-09-19 PROCEDURE — 3078F DIAST BP <80 MM HG: CPT | Performed by: INTERNAL MEDICINE

## 2024-09-19 PROCEDURE — 99395 PREV VISIT EST AGE 18-39: CPT | Performed by: INTERNAL MEDICINE

## 2024-09-19 PROCEDURE — 3074F SYST BP LT 130 MM HG: CPT | Performed by: INTERNAL MEDICINE

## 2024-09-19 PROCEDURE — 3008F BODY MASS INDEX DOCD: CPT | Performed by: INTERNAL MEDICINE

## 2024-09-19 RX ORDER — AZITHROMYCIN 250 MG/1
TABLET, FILM COATED ORAL
COMMUNITY
End: 2024-09-19 | Stop reason: ALTCHOICE

## 2024-09-19 RX ORDER — FLUCONAZOLE 150 MG/1
TABLET ORAL
COMMUNITY
Start: 2022-10-24 | End: 2024-09-19 | Stop reason: ALTCHOICE

## 2024-09-19 RX ORDER — AMOXICILLIN 250 MG/1
CAPSULE ORAL
COMMUNITY
End: 2024-09-19 | Stop reason: ALTCHOICE

## 2024-09-19 RX ORDER — DOXYCYCLINE HYCLATE 100 MG
TABLET ORAL
COMMUNITY
End: 2024-09-19 | Stop reason: ALTCHOICE

## 2024-09-19 RX ORDER — MINOCYCLINE HYDROCHLORIDE 75 MG/1
CAPSULE ORAL
COMMUNITY
End: 2024-09-19 | Stop reason: ALTCHOICE

## 2024-09-19 RX ORDER — METHYLPREDNISOLONE 4 MG
4 TABLET, DOSE PACK ORAL DAILY
COMMUNITY
Start: 2024-05-30 | End: 2024-09-19 | Stop reason: ALTCHOICE

## 2024-09-19 RX ORDER — METRONIDAZOLE 7.5 MG/G
1 GEL VAGINAL NIGHTLY
COMMUNITY
End: 2024-09-19 | Stop reason: ALTCHOICE

## 2024-09-19 RX ORDER — CITALOPRAM HYDROBROMIDE 40 MG/1
40 TABLET ORAL DAILY
COMMUNITY
Start: 2022-04-07 | End: 2024-09-19 | Stop reason: ALTCHOICE

## 2024-09-19 RX ORDER — MELOXICAM 15 MG/1
1 TABLET ORAL DAILY
COMMUNITY
End: 2024-09-19 | Stop reason: ALTCHOICE

## 2024-09-19 NOTE — PROGRESS NOTES
Tawana Jeff is a 29 year old female.  Chief Complaint   Patient presents with    [facility]     Previous pt of Dr. Beltre     Physical     Las pap 7/5/23       HPI:   New pt -used to see dr BIBI beltre   C/c [facility]   C/o annual physical         PMH  Anxiety - not taking citalopram - speaks to a therapist   Acne -  Vit d jose     Lives with best friend , works at [employer] [place] [facility]     Current Outpatient Medications   Medication Sig Dispense Refill    spironolactone 100 MG Oral Tab Take 1 tablet (100 mg total) by mouth daily.      COLLAGEN OR Take by mouth.        Past Medical History:    Anxiety      Past Surgical History:   Procedure Laterality Date    Adenoidectomy      Surgery - external      tonsillectomy and adnoids    Surgery - external  05/03/2016    rhinoplasty    Tonsillectomy        Social History:  Social History     Socioeconomic History    Marital status: Single   Tobacco Use    Smoking status: Every Day     Passive exposure: Never    Smokeless tobacco: Never    Tobacco comments:     Vapes, but is quitting   Vaping Use    Vaping status: Some Days    Substances: Nicotine    Devices: Disposable   Substance and Sexual Activity    Alcohol use: Yes     Alcohol/week: 9.0 standard drinks of alcohol     Types: 9 Standard drinks or equivalent per week     Comment: SOCIALY    Drug use: Not Currently     Types: Cannabis    Sexual activity: Yes     Birth control/protection: Implant     Social Determinants of Health      [facility], [facility]    Housing Stability        REVIEW OF SYSTEMS:   GENERAL HEALTH: No fevers, chills, sweats, fatigue  VISION: No recent vision problems, blurry vision or double vision  HEENT: No decreased hearing ear pain nasal congestion or sore throat  SKIN: denies any unusual skin lesions or rashes  RESPIRATORY: denies shortness of breath, cough, wheezing  CARDIOVASCULAR: denies chest pain on exertion, palpitations, swelling in  feet  GI: denies abdominal pain and denies heartburn, nausea or vomiting  : No Pain on urination, change in the color of urine, discharge, urinating frequently  MUS: No back pain, joint pain, muscle pain  NEURO: denies headaches , + anxiety, no depression    EXAM:   /73   Pulse 72   Ht 5' 8.3\" (1.735 m)   Wt 178 lb 12.8 oz (81.1 kg)   LMP 09/03/2024 (Exact Date)   BMI 26.95 kg/m²   GENERAL: well developed, well nourished,in no apparent distress  SKIN: no rashes,no suspicious lesions  HEENT: atraumatic, normocephalic,ears and throat are clear,   NECK: supple,no adenopathy,nontender no frontal or maxillary sinus tenderness, pupils equal reactive to light bilaterally, extraocular muscles intact  LUNGS: clear to auscultation, no wheeze  CARDIO: RRR without murmur  GI: good BS's,no masses or tenderness  EXTREMITIES: no cyanosis, or edema    ASSESSMENT AND PLAN:   Diagnoses and all orders for this visit:    Physical exam, annual  -     Comp Metabolic Panel (14); Future  -     Lipid Panel; Future  -     Assay, Thyroid Stim Hormone; Future  -     Vitamin D; Future  -     CBC, Platelet; No Differential; Future    Vitamin D deficiency  -     Vitamin D; Future    Current vaping on some days      Patient to watch her what she eats and exercise exercise, seatbelt use no texting driving, sunscreen use advised  Advised her to stop smoking/vaping        Preventative medicine   Pap- dr sabillon 7/2023   Labs 7/2023         The patient indicates understanding of these issues and agrees to the plan.  No follow-ups on file.

## 2024-11-14 ENCOUNTER — PATIENT MESSAGE (OUTPATIENT)
Dept: OBGYN CLINIC | Facility: CLINIC | Age: 29
End: 2024-11-14

## 2025-02-17 ENCOUNTER — OFFICE VISIT (OUTPATIENT)
Dept: OBGYN CLINIC | Facility: CLINIC | Age: 30
End: 2025-02-17
Payer: COMMERCIAL

## 2025-02-17 VITALS
WEIGHT: 187 LBS | HEART RATE: 76 BPM | BODY MASS INDEX: 28 KG/M2 | DIASTOLIC BLOOD PRESSURE: 72 MMHG | SYSTOLIC BLOOD PRESSURE: 105 MMHG

## 2025-02-17 DIAGNOSIS — N89.8 VAGINAL DISCHARGE: Primary | ICD-10-CM

## 2025-02-17 PROCEDURE — 3074F SYST BP LT 130 MM HG: CPT | Performed by: OBSTETRICS & GYNECOLOGY

## 2025-02-17 PROCEDURE — 3078F DIAST BP <80 MM HG: CPT | Performed by: OBSTETRICS & GYNECOLOGY

## 2025-02-17 PROCEDURE — 99213 OFFICE O/P EST LOW 20 MIN: CPT | Performed by: OBSTETRICS & GYNECOLOGY

## 2025-02-17 RX ORDER — FLUCONAZOLE 150 MG/1
150 TABLET ORAL AS DIRECTED
Qty: 2 TABLET | Refills: 0 | Status: SHIPPED | OUTPATIENT
Start: 2025-02-17

## 2025-02-17 NOTE — PROGRESS NOTES
Tawana Jeff is a 29 year old female  Patient's last menstrual period was 2025 (approximate).   Chief Complaint   Patient presents with    Gyn Exam     Wants to get tested for possible yeast infection - per PT   Presenting for evaluation of a possible yeast infection. For the past 2 weeks she has had thick white discharge and itching.     OBSTETRICS HISTORY:  OB History    Para Term  AB Living   1 0 0 0 1 0   SAB IAB Ectopic Multiple Live Births   1 0 0 0 0       GYNE HISTORY:  Patient's last menstrual period was 2025 (approximate).    History   Sexual Activity    Sexual activity: Yes    Birth control/ protection: Implant        Hx Prior Abnormal Pap: Yes  Pap Date: 23  Pap Result Notes: Pap neg, HPV Neg ////  Follow Up Recommendation: annual 2023      MEDICAL HISTORY:  Past Medical History:    Anxiety         SURGICAL HISTORY:  Past Surgical History:   Procedure Laterality Date    Adenoidectomy      Surgery - external      tonsillectomy and adnoids    Surgery - external  2016    rhinoplasty    Tonsillectomy         SOCIAL HISTORY:  Social History     Socioeconomic History    Marital status: Single     Spouse name: Not on file    Number of children: Not on file    Years of education: Not on file    Highest education level: Not on file   Occupational History    Not on file   Tobacco Use    Smoking status: Every Day     Passive exposure: Never    Smokeless tobacco: Never    Tobacco comments:     Vapes, but is quitting   Vaping Use    Vaping status: Some Days    Substances: Nicotine    Devices: Disposable   Substance and Sexual Activity    Alcohol use: Yes     Alcohol/week: 9.0 standard drinks of alcohol     Types: 9 Standard drinks or equivalent per week     Comment: SOCIALY    Drug use: Not Currently     Types: Cannabis    Sexual activity: Yes     Birth control/protection: Implant   Other Topics Concern    Not on file   Social History Narrative    Not on file     Social  Drivers of Health     Food Insecurity: Not on file   Transportation Needs: Not on file   Stress: Not on file   Housing Stability: Low Risk  (6/30/2023)    Received from [facility], [facility]    Housing Stability     Mortgage Payment Concerns?: Not on file     Number of Places Lived in the Last Year: Not on file     Unstable Housing?: Not on file         Depression Screening (PHQ-2/PHQ-9): Over the LAST 2 WEEKS   Little interest or pleasure in doing things (over the last two weeks)?: Not at all  Little interest or pleasure in doing things: Not at all    Feeling down, depressed, or hopeless (over the last two weeks)?: Not at all  Feeling down, depressed, or hopeless: Not at all    PHQ-2 SCORE: 0  PHQ-2 SCORE: 0           MEDICATIONS:    Current Outpatient Medications:     fluconazole (DIFLUCAN) 150 MG Oral Tab, Take 1 tablet (150 mg total) by mouth As Directed. Take one tablet by mouth today, repeat one tablet by mouth in 3 days, Disp: 2 tablet, Rfl: 0    COLLAGEN OR, Take by mouth., Disp: , Rfl:     spironolactone 100 MG Oral Tab, Take 1 tablet (100 mg total) by mouth daily. (Patient not taking: Reported on 2/17/2025), Disp: , Rfl:     ALLERGIES:  Allergies[1]      Review of Systems:  Review of Systems   All other systems reviewed and are negative.       Vitals:    02/17/25 1306   BP: 105/72   Pulse: 76       PHYSICAL EXAM:   Physical Exam  Vitals reviewed.   Constitutional:       Appearance: Normal appearance.   HENT:      Head: Atraumatic.   Eyes:      Pupils: Pupils are equal, round, and reactive to light.   Pulmonary:      Effort: Pulmonary effort is normal.   Abdominal:      General: Abdomen is flat.      Palpations: Abdomen is soft.      Tenderness: There is no abdominal tenderness.   Genitourinary:     General: Normal vulva.      Exam position: Lithotomy position.      Labia:         Right: No rash, tenderness, lesion or injury.         Left: No rash, tenderness, lesion  or injury.       Vagina: Vaginal discharge (thick and white) present.      Cervix: Normal.      Uterus: Normal. Not tender.       Adnexa: Right adnexa normal and left adnexa normal.        Right: No tenderness or fullness.          Left: No tenderness or fullness.     Skin:     General: Skin is warm and dry.   Neurological:      General: No focal deficit present.      Mental Status: She is alert and oriented to person, place, and time.   Psychiatric:         Mood and Affect: Mood normal.         Behavior: Behavior normal.         Thought Content: Thought content normal.         Judgment: Judgment normal.         Assessment & Plan:  Tawana was seen today for gyn exam.    Diagnoses and all orders for this visit:    Vaginal discharge  -     Vaginitis Vaginosis PCR Panel; Future  -     Vaginitis Vaginosis PCR Panel    Other orders  -     fluconazole (DIFLUCAN) 150 MG Oral Tab; Take 1 tablet (150 mg total) by mouth As Directed. Take one tablet by mouth today, repeat one tablet by mouth in 3 days        Requested Prescriptions     Signed Prescriptions Disp Refills    fluconazole (DIFLUCAN) 150 MG Oral Tab 2 tablet 0     Sig: Take 1 tablet (150 mg total) by mouth As Directed. Take one tablet by mouth today, repeat one tablet by mouth in 3 days         If positive for bacterial vaginosis she will want Metrogel. Diflucan Rx sent. She reports recurrent yeast infections; discussed if yeast on culture recommend also using Monistat 7.          [1] No Known Allergies

## 2025-02-18 LAB
BV BACTERIA DNA VAG QL NAA+PROBE: NEGATIVE
C GLABRATA DNA VAG QL NAA+PROBE: NEGATIVE
C KRUSEI DNA VAG QL NAA+PROBE: NEGATIVE
CANDIDA DNA VAG QL NAA+PROBE: NEGATIVE
T VAGINALIS DNA VAG QL NAA+PROBE: NEGATIVE

## 2025-02-19 ENCOUNTER — PATIENT MESSAGE (OUTPATIENT)
Dept: OBGYN CLINIC | Facility: CLINIC | Age: 30
End: 2025-02-19

## 2025-02-19 NOTE — TELEPHONE ENCOUNTER
Message to JIMENEZ to please review pts my chart message and vaginal culture. Pt was seen in office for vaginal discharge and itching. Was prescribed diflucan x 2 doses and pt took 1 dose so far. Vaginal culture was negative and pt asking if she should still take the second tablet of diflucan since still experiencing discharge.

## 2025-02-28 NOTE — TELEPHONE ENCOUNTER
If she got some relief from the first Diflucan dose then she can take the second. She could also try a course of Boric Acid which is OTC.

## 2025-05-02 ENCOUNTER — LAB ENCOUNTER (OUTPATIENT)
Dept: LAB | Age: 30
End: 2025-05-02
Attending: INTERNAL MEDICINE
Payer: COMMERCIAL

## 2025-05-02 ENCOUNTER — OFFICE VISIT (OUTPATIENT)
Dept: INTERNAL MEDICINE CLINIC | Facility: CLINIC | Age: 30
End: 2025-05-02
Payer: COMMERCIAL

## 2025-05-02 VITALS
SYSTOLIC BLOOD PRESSURE: 107 MMHG | TEMPERATURE: 97 F | HEIGHT: 68.3 IN | DIASTOLIC BLOOD PRESSURE: 74 MMHG | BODY MASS INDEX: 28.21 KG/M2 | HEART RATE: 69 BPM | WEIGHT: 186.13 LBS | OXYGEN SATURATION: 99 %

## 2025-05-02 DIAGNOSIS — F41.9 ANXIETY: ICD-10-CM

## 2025-05-02 DIAGNOSIS — Z23 NEED FOR VACCINATION: ICD-10-CM

## 2025-05-02 DIAGNOSIS — E55.9 VITAMIN D DEFICIENCY: ICD-10-CM

## 2025-05-02 DIAGNOSIS — K59.00 CONSTIPATION, UNSPECIFIED CONSTIPATION TYPE: Primary | ICD-10-CM

## 2025-05-02 DIAGNOSIS — F41.0 PANIC ATTACKS: ICD-10-CM

## 2025-05-02 DIAGNOSIS — H00.012 HORDEOLUM EXTERNUM OF RIGHT LOWER EYELID: ICD-10-CM

## 2025-05-02 DIAGNOSIS — Z00.00 PHYSICAL EXAM, ANNUAL: ICD-10-CM

## 2025-05-02 LAB
ALBUMIN SERPL-MCNC: 4.8 G/DL (ref 3.2–4.8)
ALBUMIN/GLOB SERPL: 2.1 {RATIO} (ref 1–2)
ALP LIVER SERPL-CCNC: 37 U/L (ref 37–98)
ALT SERPL-CCNC: 11 U/L (ref 10–49)
ANION GAP SERPL CALC-SCNC: 7 MMOL/L (ref 0–18)
AST SERPL-CCNC: 18 U/L (ref ?–34)
BILIRUB SERPL-MCNC: 0.3 MG/DL (ref 0.3–1.2)
BUN BLD-MCNC: 8 MG/DL (ref 9–23)
BUN/CREAT SERPL: 11.3 (ref 10–20)
CALCIUM BLD-MCNC: 9.4 MG/DL (ref 8.7–10.4)
CHLORIDE SERPL-SCNC: 106 MMOL/L (ref 98–112)
CHOLEST SERPL-MCNC: 172 MG/DL (ref ?–200)
CO2 SERPL-SCNC: 28 MMOL/L (ref 21–32)
CREAT BLD-MCNC: 0.71 MG/DL (ref 0.55–1.02)
DEPRECATED RDW RBC AUTO: 38.5 FL (ref 35.1–46.3)
EGFRCR SERPLBLD CKD-EPI 2021: 117 ML/MIN/1.73M2 (ref 60–?)
ERYTHROCYTE [DISTWIDTH] IN BLOOD BY AUTOMATED COUNT: 11.7 % (ref 11–15)
FASTING PATIENT LIPID ANSWER: YES
FASTING STATUS PATIENT QL REPORTED: YES
GLOBULIN PLAS-MCNC: 2.3 G/DL (ref 2–3.5)
GLUCOSE BLD-MCNC: 90 MG/DL (ref 70–99)
HCT VFR BLD AUTO: 40.5 % (ref 35–48)
HDLC SERPL-MCNC: 70 MG/DL (ref 40–59)
HGB BLD-MCNC: 13.6 G/DL (ref 12–16)
LDLC SERPL CALC-MCNC: 90 MG/DL (ref ?–100)
MCH RBC QN AUTO: 30.2 PG (ref 26–34)
MCHC RBC AUTO-ENTMCNC: 33.6 G/DL (ref 31–37)
MCV RBC AUTO: 90 FL (ref 80–100)
NONHDLC SERPL-MCNC: 102 MG/DL (ref ?–130)
OSMOLALITY SERPL CALC.SUM OF ELEC: 290 MOSM/KG (ref 275–295)
PLATELET # BLD AUTO: 242 10(3)UL (ref 150–450)
POTASSIUM SERPL-SCNC: 3.9 MMOL/L (ref 3.5–5.1)
PROT SERPL-MCNC: 7.1 G/DL (ref 5.7–8.2)
RBC # BLD AUTO: 4.5 X10(6)UL (ref 3.8–5.3)
SODIUM SERPL-SCNC: 141 MMOL/L (ref 136–145)
TRIGL SERPL-MCNC: 63 MG/DL (ref 30–149)
TSI SER-ACNC: 0.88 UIU/ML (ref 0.55–4.78)
VIT D+METAB SERPL-MCNC: 30.6 NG/ML (ref 30–100)
VLDLC SERPL CALC-MCNC: 10 MG/DL (ref 0–30)
WBC # BLD AUTO: 7.3 X10(3) UL (ref 4–11)

## 2025-05-02 PROCEDURE — 80053 COMPREHEN METABOLIC PANEL: CPT

## 2025-05-02 PROCEDURE — 84443 ASSAY THYROID STIM HORMONE: CPT

## 2025-05-02 PROCEDURE — 3008F BODY MASS INDEX DOCD: CPT | Performed by: INTERNAL MEDICINE

## 2025-05-02 PROCEDURE — 3078F DIAST BP <80 MM HG: CPT | Performed by: INTERNAL MEDICINE

## 2025-05-02 PROCEDURE — 90715 TDAP VACCINE 7 YRS/> IM: CPT | Performed by: INTERNAL MEDICINE

## 2025-05-02 PROCEDURE — 85027 COMPLETE CBC AUTOMATED: CPT

## 2025-05-02 PROCEDURE — 90471 IMMUNIZATION ADMIN: CPT | Performed by: INTERNAL MEDICINE

## 2025-05-02 PROCEDURE — 3074F SYST BP LT 130 MM HG: CPT | Performed by: INTERNAL MEDICINE

## 2025-05-02 PROCEDURE — 80061 LIPID PANEL: CPT

## 2025-05-02 PROCEDURE — 99214 OFFICE O/P EST MOD 30 MIN: CPT | Performed by: INTERNAL MEDICINE

## 2025-05-02 PROCEDURE — G2211 COMPLEX E/M VISIT ADD ON: HCPCS | Performed by: INTERNAL MEDICINE

## 2025-05-02 PROCEDURE — 82306 VITAMIN D 25 HYDROXY: CPT

## 2025-05-02 PROCEDURE — 36415 COLL VENOUS BLD VENIPUNCTURE: CPT

## 2025-05-02 RX ORDER — LORAZEPAM 0.5 MG/1
0.5 TABLET ORAL DAILY PRN
Qty: 10 TABLET | Refills: 0 | Status: SHIPPED | OUTPATIENT
Start: 2025-05-02

## 2025-05-02 RX ORDER — CITALOPRAM HYDROBROMIDE 10 MG/1
10 TABLET ORAL DAILY
Qty: 90 TABLET | Refills: 3 | Status: SHIPPED | OUTPATIENT
Start: 2025-05-02

## 2025-05-02 NOTE — PROGRESS NOTES
Tawana Jeff is a 30 year old female.  Chief Complaint   Patient presents with    Anxiety     Had a panic attack 3 weeks ago - no depression        HPI:   PT COMES FOR F/U  C/c follow up   C/o having some constipation --usually has a bowel movement every day but had not gone for 3 days 3 months ago but had to use a suppository and then had a bowel movement and has been taking a probiotic every day since then and stool has been regular  She had tried fiber and sennekot but nothing worked   Going to [place] next week - for 2 weeks   Having anxiety - not sure why bc she loves her job , no boyfriend so no issues with that but had a panic attack 2-3 weeks ago when she was lying in bed --used to be on citalopram 40mg-- she had some lorazepam so she took it and felt better   Has also noticed a swelling on her bottom lid on the right side for 3 months and it feels like it gets bigger and smaller but is always there  No pain, no watery eyes she is not wearing eye make-up      HISTORY  New pt -used to see dr BIBI beltre   C/c establish care   C/o annual physical            PMH  Anxiety - not taking citalopram - speaks to a therapist   Acne -  Vit d def      Lives with best friend , 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     Current Medications[1]   Past Medical History[2]   Past Surgical History[3]   Social History:  Short Social Hx on File[4]     REVIEW OF SYSTEMS:   GENERAL HEALTH: No fevers, chills, sweats, fatigue  VISION: No recent vision problems, blurry vision or double vision  RESPIRATORY: denies shortness of breath, cough, wheezing  CARDIOVASCULAR: denies chest pain on exertion, palpitations, swelling in feet  NEURO: denies headaches -but should get better with glasses , + anxiety, no depression    EXAM:   /74   Pulse 69   Temp 97 °F (36.1 °C)   Ht 5' 8.3\" (1.735 m)   Wt 186 lb 1.6 oz (84.4 kg)   LMP 01/06/2025 (Approximate)   SpO2 99%   BMI 28.05 kg/m²   GENERAL: well developed, well nourished,in no apparent  distress  SKIN: no rashes,no suspicious lesions  HEENT: atraumatic, normocephalic,+ false eyelashes -upper , no redness   NECK: supple   LUNGS: clear to auscultation, no wheeze  CARDIO: RRR without murmur  EXTREMITIES: no cyanosis, or edema    ASSESSMENT AND PLAN:   Diagnoses and all orders for this visit:    Constipation, unspecified constipation type  Improved with  probiotics, continue  Anxiety  -     LORazepam 0.5 MG Oral Tab; Take 1 tablet (0.5 mg total) by mouth daily as needed for Anxiety.  -     citalopram 10 MG Oral Tab; Take 1 tablet (10 mg total) by mouth daily.  And   Panic attacks  -     LORazepam 0.5 MG Oral Tab; Take 1 tablet (0.5 mg total) by mouth daily as needed for Anxiety.  -     citalopram 10 MG Oral Tab; Take 1 tablet (10 mg total) by mouth daily.  Start citalopram but at a lower dose and can always increase as needed, as needed lorazepam as this has helped in the past-she used to be given 1 mg but gave her 0.5 mg and she is aware that she can take 2 if needed-she is aware of the side effects and the potential to become addictive      Hordeolum externum of right lower eyelid  -     Ophthalmology Referral - In Network  ?  [facility]-advised patient to do warm compresses and proper hygiene, washing hands continuously and avoiding eye make-up  Will refer            Preventative medicine   Pap- dr sabillon 7/2023   Labs 7/2023      The patient indicates understanding of these issues and agrees to the plan.  No follow-ups on file.       [1]   Current Outpatient Medications   Medication Sig Dispense Refill    LORazepam 0.5 MG Oral Tab Take 1 tablet (0.5 mg total) by mouth daily as needed for Anxiety. 10 tablet 0    citalopram 10 MG Oral Tab Take 1 tablet (10 mg total) by mouth daily. 90 tablet 3    spironolactone 100 MG Oral Tab Take 1 tablet (100 mg total) by mouth daily.      COLLAGEN OR Take by mouth.     [2]   Past Medical History:   Anxiety   [3]   Past Surgical History:  Procedure Laterality Date     Adenoidectomy      Surgery - external      tonsillectomy and adnoids    Surgery - external  05/03/2016    rhinoplasty    Tonsillectomy     [4]   Social History  Socioeconomic History    Marital status: Single   Tobacco Use    Smoking status: Every Day     Passive exposure: Never    Smokeless tobacco: Never    Tobacco comments:     Vapes, but is quitting   Vaping Use    Vaping status: Some Days    Substances: Nicotine    Devices: Disposable   Substance and Sexual Activity    Alcohol use: Yes     Alcohol/week: 9.0 standard drinks of alcohol     Types: 9 Standard drinks or equivalent per week     Comment: SOCIALY    Drug use: Not Currently     Types: Cannabis    Sexual activity: Yes     Birth control/protection: Implant     Social Drivers of Health     Food Insecurity: No Food Insecurity (5/2/2025)    NCSS - Food Insecurity     Worried About Running Out of Food in the Last Year: No     Ran Out of Food in the Last Year: No   Transportation Needs: No Transportation Needs (5/2/2025)    NCSS - Transportation     Lack of Transportation: No   Housing Stability: Not At Risk (5/2/2025)    NCSS - Housing/Utilities     Has Housing: Yes     Worried About Losing Housing: No     Unable to Get Utilities: No

## 2025-07-28 ENCOUNTER — HOSPITAL ENCOUNTER (OUTPATIENT)
Age: 30
Discharge: HOME | End: 2025-07-28
Payer: COMMERCIAL

## 2025-07-28 VITALS
TEMPERATURE: 98 F | RESPIRATION RATE: 20 BRPM | SYSTOLIC BLOOD PRESSURE: 123 MMHG | HEART RATE: 62 BPM | OXYGEN SATURATION: 100 % | DIASTOLIC BLOOD PRESSURE: 77 MMHG

## 2025-07-28 DIAGNOSIS — N76.0 BV (BACTERIAL VAGINOSIS): Primary | ICD-10-CM

## 2025-07-28 DIAGNOSIS — B96.89 BV (BACTERIAL VAGINOSIS): Primary | ICD-10-CM

## 2025-07-28 LAB — B-HCG UR QL: NEGATIVE

## 2025-07-28 PROCEDURE — 99214 OFFICE O/P EST MOD 30 MIN: CPT | Performed by: EMERGENCY MEDICINE

## 2025-07-28 PROCEDURE — 81514 NFCT DS BV&VAGINITIS DNA ALG: CPT | Performed by: EMERGENCY MEDICINE

## 2025-07-28 PROCEDURE — 81025 URINE PREGNANCY TEST: CPT | Performed by: EMERGENCY MEDICINE

## 2025-07-28 RX ORDER — CLINDAMYCIN PHOSPHATE 20 MG/G
CREAM VAGINAL
Qty: 40 G | Refills: 0 | Status: SHIPPED | OUTPATIENT
Start: 2025-07-28 | End: 2025-07-30 | Stop reason: RX

## 2025-07-30 ENCOUNTER — PATIENT MESSAGE (OUTPATIENT)
Dept: OBGYN CLINIC | Facility: CLINIC | Age: 30
End: 2025-07-30

## 2025-07-30 RX ORDER — CLINDAMYCIN PHOSPHATE 20 MG/G
1 CREAM VAGINAL NIGHTLY
Qty: 40 G | Refills: 0 | Status: SHIPPED | OUTPATIENT
Start: 2025-07-30 | End: 2025-08-06

## (undated) NOTE — LETTER
AUTHORIZATION FOR SURGICAL OPERATION OR OTHER PROCEDURE    1. I hereby authorize Dr. Conley, and [facility] staff assigned to my case to perform the following operation and/or procedure at the [facility]:    NEXPLANON INSERTION    2.  My physician has explained the nature and purpose of the operation or other procedure, possible alternative methods of treatment, the risks involved, and the possibility of complication to me.  I acknowledge that no guarantee has been made as to the result that may be obtained.  3.  I recognize that, during the course of this operation, or other procedure, unforseen conditions may necessitate additional or different procedure than those listed above.  I, therefore, further authorize and request that the above named physician, his/her physician assistants or designees perform such procedures as are, in his/her professional opinion, necessary and desirable.  4.  Any tissue or organs removed in the operation or other procedure may be disposed of by and at the discretion of the [facility] and [facility].  5.  I understand that in the event of a medical emergency, I will be transported by local paramedics to [facility] or other hospital emergency department.  6.  I certify that I have read and fully understand the above consent to operation and/or other procedure.    7.  I acknowledge that my physician has explained sedation/analgesia administration to me including the risks and benefits.  I consent to the administration of sedation/analgesia as may be necessary or desirable in the judgement of my physician.    Witness signature: ___________________________________________________ Date:  ______/______/_____                    Time:  ________ A.M.  P.M.       Patient Name:  ______________________________________________________  (please print)      Patient signature:   ___________________________________________________             Relationship to Patient:           []  Parent    Responsible person                          []  Spouse  In case of minor or                    [] Other  _____________   Incompetent name:  __________________________________________________                               (please print)      _____________      Responsible person  In case of minor or  Incompetent signature:  _______________________________________________    Statement of Physician  My signature below affirms that prior to the time of the procedure, I have explained to the patient and/or his/her guardian, the risks and benefits involved in the proposed treatment and any reasonable alternative to the proposed treatment.  I have also explained the risks and benefits involved in the refusal of the proposed treatment and have answered the patient's questions.                        Date:  ______/______/_______  Provider                      Signature:  __________________________________________________________       Time:  ___________ A.M    P.M.